# Patient Record
Sex: FEMALE | Race: BLACK OR AFRICAN AMERICAN | Employment: UNEMPLOYED | URBAN - METROPOLITAN AREA
[De-identification: names, ages, dates, MRNs, and addresses within clinical notes are randomized per-mention and may not be internally consistent; named-entity substitution may affect disease eponyms.]

---

## 2017-07-30 ENCOUNTER — HOSPITAL ENCOUNTER (EMERGENCY)
Age: 57
Discharge: HOME | End: 2017-07-31
Attending: EMERGENCY MEDICINE | Admitting: EMERGENCY MEDICINE
Payer: MEDICAID

## 2017-07-30 VITALS
DIASTOLIC BLOOD PRESSURE: 101 MMHG | SYSTOLIC BLOOD PRESSURE: 146 MMHG | TEMPERATURE: 98.2 F | RESPIRATION RATE: 18 BRPM | OXYGEN SATURATION: 98 % | WEIGHT: 202 LBS | HEART RATE: 98 BPM | BODY MASS INDEX: 29.4 KG/M2

## 2017-07-30 DIAGNOSIS — R03.0 ELEVATED BLOOD PRESSURE READING: ICD-10-CM

## 2017-07-30 DIAGNOSIS — M54.40 ACUTE RIGHT-SIDED LOW BACK PAIN WITH SCIATICA, SCIATICA LATERALITY UNSPECIFIED: Primary | ICD-10-CM

## 2017-07-30 PROCEDURE — 96372 THER/PROPH/DIAG INJ SC/IM: CPT

## 2017-07-30 PROCEDURE — 99282 EMERGENCY DEPT VISIT SF MDM: CPT

## 2017-07-31 PROCEDURE — 74011250636 HC RX REV CODE- 250/636: Performed by: NURSE PRACTITIONER

## 2017-07-31 RX ORDER — MORPHINE SULFATE 4 MG/ML
4 INJECTION, SOLUTION INTRAMUSCULAR; INTRAVENOUS
Status: COMPLETED | OUTPATIENT
Start: 2017-07-31 | End: 2017-07-31

## 2017-07-31 RX ORDER — OXYCODONE AND ACETAMINOPHEN 5; 325 MG/1; MG/1
1 TABLET ORAL
Qty: 12 TAB | Refills: 0 | Status: SHIPPED | OUTPATIENT
Start: 2017-07-31 | End: 2018-03-13

## 2017-07-31 RX ORDER — CYCLOBENZAPRINE HCL 10 MG
10 TABLET ORAL
Qty: 12 TAB | Refills: 0 | Status: SHIPPED | OUTPATIENT
Start: 2017-07-31 | End: 2018-03-13

## 2017-07-31 RX ADMIN — Medication 4 MG: at 00:16

## 2017-07-31 NOTE — ED NOTES
I have reviewed discharge instructions with the patient. The patient verbalized understanding. Discharge medications reviewed with patient and appropriate educational materials and side effects teaching were provided. Patient armband removed and shredded. Pt ambulated to [facility] to await ride home.

## 2017-07-31 NOTE — DISCHARGE INSTRUCTIONS
Back Pain: Care Instructions  Your Care Instructions    Back pain has many possible causes. It is often related to problems with muscles and ligaments of the back. It may also be related to problems with the nerves, discs, or bones of the back. Moving, lifting, standing, sitting, or sleeping in an awkward way can strain the back. Sometimes you don't notice the injury until later. Arthritis is another common cause of back pain. Although it may hurt a lot, back pain usually improves on its own within several weeks. Most people recover in 12 weeks or less. Using good home treatment and being careful not to stress your back can help you feel better sooner. Follow-up care is a key part of your treatment and safety. Be sure to make and go to all appointments, and call your doctor if you are having problems. Its also a good idea to know your test results and keep a list of the medicines you take. How can you care for yourself at home? · Sit or lie in positions that are most comfortable and reduce your pain. Try one of these positions when you lie down:  ¨ Lie on your back with your knees bent and supported by large pillows. ¨ Lie on the floor with your legs on the seat of a sofa or chair. Danilo Ebbing on your side with your knees and hips bent and a pillow between your legs. ¨ Lie on your stomach if it does not make pain worse. · Do not sit up in bed, and avoid soft couches and twisted positions. Bed rest can help relieve pain at first, but it delays healing. Avoid bed rest after the first day of back pain. · Change positions every 30 minutes. If you must sit for long periods of time, take breaks from sitting. Get up and walk around, or lie in a comfortable position. · Try using a heating pad on a low or medium setting for 15 to 20 minutes every 2 or 3 hours. Try a warm shower in place of one session with the heating pad. · You can also try an ice pack for 10 to 15 minutes every 2 to 3 hours.  Put a thin cloth between the ice pack and your skin. · Take pain medicines exactly as directed. ¨ If the doctor gave you a prescription medicine for pain, take it as prescribed. ¨ If you are not taking a prescription pain medicine, ask your doctor if you can take an over-the-counter medicine. · Take short walks several times a day. You can start with 5 to 10 minutes, 3 or 4 times a day, and work up to longer walks. Walk on level surfaces and avoid hills and stairs until your back is better. · Return to work and other activities as soon as you can. Continued rest without activity is usually not good for your back. · To prevent future back pain, do exercises to stretch and strengthen your back and stomach. Learn how to use good posture, safe lifting techniques, and proper body mechanics. When should you call for help? Call your doctor now or seek immediate medical care if:  · You have new or worsening numbness in your legs. · You have new or worsening weakness in your legs. (This could make it hard to stand up.)  · You lose control of your bladder or bowels. Watch closely for changes in your health, and be sure to contact your doctor if:  · Your pain gets worse. · You are not getting better after 2 weeks. Where can you learn more? Go to http://jessica-christina.info/. Enter R803 in the search box to learn more about \"Back Pain: Care Instructions. \"  Current as of: March 21, 2017  Content Version: 11.3  © 1397-4907 [employer]. Care instructions adapted under license by [employer] (which disclaims liability or warranty for this information). If you have questions about a medical condition or this instruction, always ask your healthcare professional. [address] any warranty or liability for your use of this information. Leapforce Activation    Thank you for requesting access to [employer].  Please follow the instructions below to securely access and download your online medical record. DooBop allows you to send messages to your doctor, view your test results, renew your prescriptions, schedule appointments, and more. How Do I Sign Up? 1. In your internet browser, go to [email or web address]  2. Click on the First Time User? Click Here link in the Sign In box. You will be redirect to the New Member Sign Up page. 3. Enter your DooBop Access Code exactly as it appears below. You will not need to use this code after youve completed the sign-up process. If you do not sign up before the expiration date, you must request a new code. DooBop Access Code: [email or web address]  Expires: 10/29/2017  1:22 AM (This is the date your DooBop access code will )    4. Enter the last four digits of your Social Security Number (xxxx) and Date of Birth (mm/dd/yyyy) as indicated and click Submit. You will be taken to the next sign-up page. 5. Create a DooBop ID. This will be your DooBop login ID and cannot be changed, so think of one that is secure and easy to remember. 6. Create a DooBop password. You can change your password at any time. 7. Enter your Password Reset Question and Answer. This can be used at a later time if you forget your password. 8. Enter your e-mail address. You will receive e-mail notification when new information is available in [address] 9. Click Sign Up. You can now view and download portions of your medical record. 10. Click the Download Summary menu link to download a portable copy of your medical information. Additional Information    If you have questions, please visit the Frequently Asked Questions section of the DooBop website at [email or web address] Remember, DooBop is NOT to be used for urgent needs. For medical emergencies, dial 911.

## 2017-07-31 NOTE — ED PROVIDER NOTES
HPI Comments:   12:00 AM  62 y.o. female presents to ED C/O back pain. Patient has a HX of HTN, asthma, sciatica, HIV. Patient reports she is having an exacerbation of her sciatica, this happens every 4-5 months. Patient reports right lower back and radiates to both legs, worse on right leg. Patient denies loss of sensation to legs, loss of bowel or bladder function, trauma, injury. Patient smokes 1/2ppd. Pt denies any other sxs or complaints. Written by Dirk RAMOS      The history is provided by the patient. History limited by: No langauge barrier. Past Medical History:   Diagnosis Date    Asthma     [facility]     [facility]     Hypertension     Sciatica        History reviewed. No pertinent surgical history. Family History:   Problem Relation Age of Onset    Asthma Other     Hypertension Other        Social History     Social History    Marital status: LEGALLY      Spouse name: N/A    Number of children: N/A    Years of education: N/A     Occupational History    Not on file. Social History Main Topics    Smoking status: Current Every Day Smoker     Packs/day: 0.50     Years: 43.00    Smokeless tobacco: Not on file    Alcohol use Yes      Comment: social     Drug use: No    Sexual activity: Not on file     Other Topics Concern    Not on file     Social History Narrative         ALLERGIES: Benadryl [diphenhydramine hcl]; Lortab [hydrocodone-acetaminophen]; Motrin [ibuprofen]; and Penicillins    Review of Systems   Respiratory: Negative for shortness of breath. Cardiovascular: Negative for chest pain. Gastrointestinal: Negative for abdominal pain. Musculoskeletal: Positive for back pain. Neurological: Negative for dizziness, syncope, weakness and light-headedness.        Vitals:    07/30/17 2256   BP: (!) 146/101   Pulse: 98   Resp: 18   Temp: 98.2 °F (36.8 °C) SpO2: 98%   Weight: 91.6 kg (202 lb)            Physical Exam   Constitutional: She is oriented to person, place, and time. She appears well-developed and well-nourished. No distress. Cardiovascular: Normal rate, regular rhythm and normal heart sounds. Pulses:       Dorsalis pedis pulses are 2+ on the right side, and 2+ on the left side. Pulmonary/Chest: Effort normal and breath sounds normal. No respiratory distress. She has no wheezes. She has no rales. Musculoskeletal:        Back:    Neurological: She is alert and oriented to person, place, and time. She exhibits normal muscle tone. Coordination normal.   Skin: Skin is warm and dry. No rash noted. She is not diaphoretic. No erythema. No pallor. Nursing note and vitals reviewed. MDM  Number of Diagnoses or Management Options  Acute right-sided low back pain with sciatica, sciatica laterality unspecified:   Elevated blood pressure reading:   Diagnosis management comments: Clinical Impression - acute exacerbation of chronic sciatica    MDM:  Pain shot given. No indication for imagining, no acute trauma, or change in pain quality, no neuro deficits. Patient given 1 morphine shot prior to discharge with some improvement  in pain, will discharge with flexeril and percocet, patient reports prednisone doesn't help. Patient referred to PCP for elevated blood pressure and to orthopedics for further evaluation of sciatica. Patient educated to return to the ED for any new or worsening symptoms. questions denied. ED Course       Procedures             RESULTS:    No orders to display       Labs Reviewed - No data to display    No results found for this or any previous visit (from the past 12 hour(s)). PROGRESS NOTE:   12:00AM  Initial assessment completed. Written by Dirk Loya NPYEN     One or more blood pressure readings were noted elevated during the Pt's presentation in the emergency department this date.   This abnormal reading has been cited in the Pt's diagnosis, and they have been encouraged to follow up with their primary care physician, or referred to a consultant for further evaluation and treatment. DISCHARGE NOTE:    Mitzy Flaherty Caesar's  results have been reviewed with her. She has been counseled regarding her diagnosis, treatment, and plan. She verbally conveys understanding and agreement of the signs, symptoms, diagnosis, treatment and prognosis and additionally agrees to follow up as discussed. She also agrees with the care-plan and conveys that all of her questions have been answered. I have also provided discharge instructions for her that include: educational information regarding their diagnosis and treatment, and list of reasons why they would want to return to the ED prior to their follow-up appointment, should her condition change. CLINICAL IMPRESSION:    1. Acute right-sided low back pain with sciatica, sciatica laterality unspecified    2. Elevated blood pressure reading        AFTER VISIT PLAN:    Discharge Medication List as of 7/31/2017  1:23 AM      START taking these medications    Details   oxyCODONE-acetaminophen (PERCOCET) 5-325 mg per tablet Take 1 Tab by mouth every six (6) hours as needed for Pain. Max Daily Amount: 4 Tabs., Print, Disp-12 Tab, R-0         CONTINUE these medications which have CHANGED    Details   cyclobenzaprine (FLEXERIL) 10 mg tablet Take 1 Tab by mouth three (3) times daily as needed for Muscle Spasm(s). , Print, Disp-12 Tab, R-0         CONTINUE these medications which have NOT CHANGED    Details   traZODone (DESYREL) 150 mg tablet Take 150 mg by mouth nightly., Historical Med      sertraline (ZOLOFT) 25 mg tablet Take  by mouth daily. , Historical Med      albuterol (PROVENTIL VENTOLIN) 2.5 mg /3 mL (0.083 %) nebulizer solution 3 mL by Nebulization route every four (4) hours as needed for Wheezing or Shortness of Breath., Print, Disp-24 Each, R-0 ELVITEGR/COBICIST/EMTRIC/TENOF (STRIBILD PO) Take  by mouth., Historical Med      hydrochlorothiazide (HYDRODIURIL) 25 mg tablet Take 25 mg by mouth daily. , Historical Med         STOP taking these medications       HYDROcodone-acetaminophen (NORCO) 5-325 mg per tablet Comments:   Reason for Stopping:                 Follow-up Information     Follow up With Details Comments [facility] MD DEAN Schedule an appointment as soon as possible for a visit in 1 week Further evaluation [address] [place] [id number]  [address] [place] [address] In 1 week As needed [address] Little, [address] [place] [address]  [phone]           Written by Clovis RAMOS

## 2017-07-31 NOTE — ED NOTES
Pt c/o sciatic pain that starts in her back and radiates down the back of her right leg. States this happens \"every six months. \" denies incontinence

## 2018-03-13 ENCOUNTER — APPOINTMENT (OUTPATIENT)
Dept: GENERAL RADIOLOGY | Age: 58
End: 2018-03-13
Attending: PHYSICIAN ASSISTANT
Payer: MEDICAID

## 2018-03-13 ENCOUNTER — HOSPITAL ENCOUNTER (EMERGENCY)
Age: 58
Discharge: HOME | End: 2018-03-13
Attending: EMERGENCY MEDICINE | Admitting: EMERGENCY MEDICINE
Payer: MEDICAID

## 2018-03-13 VITALS
DIASTOLIC BLOOD PRESSURE: 99 MMHG | RESPIRATION RATE: 15 BRPM | HEART RATE: 102 BPM | OXYGEN SATURATION: 99 % | SYSTOLIC BLOOD PRESSURE: 158 MMHG | TEMPERATURE: 98.2 F

## 2018-03-13 DIAGNOSIS — J45.21 MILD INTERMITTENT ASTHMA WITH ACUTE EXACERBATION IN ADULT: ICD-10-CM

## 2018-03-13 DIAGNOSIS — R09.81 NASAL CONGESTION: ICD-10-CM

## 2018-03-13 DIAGNOSIS — J06.9 ACUTE URI: Primary | ICD-10-CM

## 2018-03-13 DIAGNOSIS — J02.9 SORE THROAT: ICD-10-CM

## 2018-03-13 PROBLEM — B20 HIV DISEASE (HCC): Status: ACTIVE | Noted: 2018-03-13

## 2018-03-13 PROCEDURE — 71046 X-RAY EXAM CHEST 2 VIEWS: CPT

## 2018-03-13 PROCEDURE — 94640 AIRWAY INHALATION TREATMENT: CPT

## 2018-03-13 PROCEDURE — 74011636637 HC RX REV CODE- 636/637: Performed by: EMERGENCY MEDICINE

## 2018-03-13 PROCEDURE — 74011250637 HC RX REV CODE- 250/637: Performed by: EMERGENCY MEDICINE

## 2018-03-13 PROCEDURE — 77030029684 HC NEB SM VOL KT MONA -A

## 2018-03-13 PROCEDURE — 99283 EMERGENCY DEPT VISIT LOW MDM: CPT

## 2018-03-13 PROCEDURE — 74011000250 HC RX REV CODE- 250: Performed by: EMERGENCY MEDICINE

## 2018-03-13 RX ORDER — BUSPIRONE HYDROCHLORIDE 10 MG/1
10 TABLET ORAL 3 TIMES DAILY
COMMUNITY

## 2018-03-13 RX ORDER — PREDNISONE 20 MG/1
60 TABLET ORAL
Status: COMPLETED | OUTPATIENT
Start: 2018-03-13 | End: 2018-03-13

## 2018-03-13 RX ORDER — PREDNISONE 10 MG/1
TABLET ORAL
Qty: 21 TAB | Refills: 0 | Status: SHIPPED | OUTPATIENT
Start: 2018-03-13 | End: 2018-04-20

## 2018-03-13 RX ORDER — HYDROCODONE POLISTIREX AND CHLORPHENIRAMINE POLISTIREX 10; 8 MG/5ML; MG/5ML
5 SUSPENSION, EXTENDED RELEASE ORAL
Qty: 60 ML | Refills: 0 | Status: SHIPPED | OUTPATIENT
Start: 2018-03-13 | End: 2018-04-20

## 2018-03-13 RX ORDER — OXYMETAZOLINE HCL 0.05 %
2 SPRAY, NON-AEROSOL (ML) NASAL
Status: COMPLETED | OUTPATIENT
Start: 2018-03-13 | End: 2018-03-13

## 2018-03-13 RX ORDER — ONDANSETRON 4 MG/1
4 TABLET, ORALLY DISINTEGRATING ORAL
Qty: 10 TAB | Refills: 0 | Status: SHIPPED | OUTPATIENT
Start: 2018-03-13

## 2018-03-13 RX ORDER — IPRATROPIUM BROMIDE AND ALBUTEROL SULFATE 2.5; .5 MG/3ML; MG/3ML
3 SOLUTION RESPIRATORY (INHALATION)
Status: COMPLETED | OUTPATIENT
Start: 2018-03-13 | End: 2018-03-13

## 2018-03-13 RX ORDER — HYDROCODONE POLISTIREX AND CHLORPHENIRAMINE POLISTIREX 10; 8 MG/5ML; MG/5ML
5 SUSPENSION, EXTENDED RELEASE ORAL
Status: COMPLETED | OUTPATIENT
Start: 2018-03-13 | End: 2018-03-13

## 2018-03-13 RX ORDER — PAROXETINE 10 MG/1
TABLET, FILM COATED ORAL DAILY
COMMUNITY

## 2018-03-13 RX ORDER — OXYMETAZOLINE HCL 0.05 %
2 SPRAY, NON-AEROSOL (ML) NASAL 2 TIMES DAILY
Qty: 1 EACH | Refills: 0 | Status: SHIPPED | OUTPATIENT
Start: 2018-03-13 | End: 2018-03-16

## 2018-03-13 RX ORDER — ONDANSETRON 4 MG/1
4 TABLET, ORALLY DISINTEGRATING ORAL
Status: COMPLETED | OUTPATIENT
Start: 2018-03-13 | End: 2018-03-13

## 2018-03-13 RX ORDER — GABAPENTIN 100 MG/1
CAPSULE ORAL 3 TIMES DAILY
COMMUNITY

## 2018-03-13 RX ORDER — EMTRICITABINE AND TENOFOVIR DISOPROXIL FUMARATE 200; 300 MG/1; MG/1
TABLET, FILM COATED ORAL DAILY
COMMUNITY
End: 2018-04-20

## 2018-03-13 RX ADMIN — ONDANSETRON 4 MG: 4 TABLET, ORALLY DISINTEGRATING ORAL at 20:15

## 2018-03-13 RX ADMIN — PREDNISONE 60 MG: 20 TABLET ORAL at 20:15

## 2018-03-13 RX ADMIN — HYDROCODONE POLISTIREX AND CHLORPHENIRAMINE POLISTIREX 5 ML: 10; 8 SUSPENSION, EXTENDED RELEASE ORAL at 20:16

## 2018-03-13 RX ADMIN — OXYMETAZOLINE HYDROCHLORIDE 2 SPRAY: 5 SPRAY NASAL at 20:17

## 2018-03-13 RX ADMIN — IPRATROPIUM BROMIDE AND ALBUTEROL SULFATE 3 ML: .5; 3 SOLUTION RESPIRATORY (INHALATION) at 20:16

## 2018-03-13 NOTE — ED TRIAGE NOTES
Seen at [facility] and told has flu. Pt feels has sinus infection.  Facial pressure and congestion

## 2018-03-13 NOTE — ED PROVIDER NOTES
HPI Comments: Nano Ferguson is a 62 y.o. Female, with h/o hiv, well controlled with c/o nasal congestion, facial pressure, cough, wheezing, body aches sore throat for last 3 days. Seen at [facility] for same, placed on tamiflu, cxr neg. States her sx are worse. Using theraflu without relief. occ wheezing, prod cough. No hemoptysis, fever, vomiting, diarrhea, urinary , sx rash. Worse with breathing activity; h/o asthma with inhaler, neb at home    The history is provided by the patient and medical records. Past Medical History:   Diagnosis Date    Asthma     [facility]     HIV (human immunodeficiency virus infection) [facility]     Hypertension     Sciatica        History reviewed. No pertinent surgical history. Family History:   Problem Relation Age of Onset    Asthma Other     Hypertension Other        Social History     Social History    Marital status: LEGALLY      Spouse name: N/A    Number of children: N/A    Years of education: N/A     Occupational History    Not on file. Social History Main Topics    Smoking status: Current Every Day Smoker     Packs/day: 0.50     Years: 43.00    Smokeless tobacco: Never Used    Alcohol use Yes      Comment: social     Drug use: No    Sexual activity: Not on file     Other Topics Concern    Not on file     Social History Narrative         ALLERGIES: Benadryl [diphenhydramine hcl]; Lortab [hydrocodone-acetaminophen]; Motrin [ibuprofen]; and Penicillins    Review of Systems   Constitutional: Positive for fatigue. Negative for fever. HENT: Positive for congestion, postnasal drip, rhinorrhea, sinus pain, sinus pressure, sneezing and sore throat. Negative for facial swelling, tinnitus, trouble swallowing and voice change. Eyes: Negative for visual disturbance. Respiratory: Positive for cough, shortness of breath and wheezing.     Cardiovascular: Positive for chest pain (with cough). Gastrointestinal: Negative for abdominal pain. Endocrine: Negative for polyuria. Genitourinary: Negative for difficulty urinating and dysuria. Musculoskeletal: Positive for myalgias. Negative for gait problem. Skin: Negative for rash. Allergic/Immunologic: Negative for immunocompromised state. Neurological: Positive for dizziness and headaches. Psychiatric/Behavioral: Positive for sleep disturbance. Vitals:    03/13/18 1854 03/13/18 2033   BP: (!) 157/100 (!) 158/99   Pulse: (!) 109 (!) 102   Resp: 15    Temp: 98.2 °F (36.8 °C)    SpO2: 98% 99%            Physical Exam   Constitutional: She is oriented to person, place, and time. She appears well-developed and well-nourished. No distress. She is not intubated. HENT:   Head: Normocephalic and atraumatic. Right Ear: External ear normal.   Left Ear: External ear normal.   Nose: Mucosal edema and rhinorrhea present. Right sinus exhibits maxillary sinus tenderness and frontal sinus tenderness. Left sinus exhibits maxillary sinus tenderness and frontal sinus tenderness. Mouth/Throat: Uvula is midline and mucous membranes are normal. No oral lesions. Posterior oropharyngeal erythema present. No oropharyngeal exudate, posterior oropharyngeal edema or tonsillar abscesses. Eyes: Conjunctivae are normal. No scleral icterus. Neck: Neck supple. Cardiovascular: Normal rate, regular rhythm, normal heart sounds and intact distal pulses. Pulmonary/Chest: Effort normal. No tachypnea. She is not intubated. No respiratory distress. She has no decreased breath sounds. She has wheezes. She has no rhonchi. She has no rales. Abdominal: Soft. There is no tenderness. Musculoskeletal: She exhibits no edema. Neurological: She is alert and oriented to person, place, and time. Gait normal.   Skin: Skin is warm and dry. She is not diaphoretic. Psychiatric: Her behavior is normal.   Nursing note and vitals reviewed.        MDM      ED Course Procedures    Vitals:  Patient Vitals for the past 12 hrs:   Temp Pulse Resp BP SpO2   03/13/18 2033 - (!) 102 - (!) 158/99 99 %   03/13/18 1854 98.2 °F (36.8 °C) (!) 109 15 (!) 157/100 98 %         Medications ordered:   Medications   albuterol-ipratropium (DUO-NEB) 2.5 MG-0.5 MG/3 ML (3 mL Nebulization Given 3/13/18 2016)   predniSONE (DELTASONE) tablet 60 mg (60 mg Oral Given 3/13/18 2015)   ondansetron (ZOFRAN ODT) tablet 4 mg (4 mg Oral Given 3/13/18 2015)   chlorpheniramine-HYDROcodone (TUSSIONEX) oral suspension 5 mL (5 mL Oral Given 3/13/18 2016)   oxymetazoline (AFRIN) 0.05 % nasal spray 2 Spray (2 Sprays Both Nostrils Given 3/13/18 2017)         Lab findings:  No results found for this or any previous visit (from the past 12 hour(s)). EKG interpretation by ED Physician:      X-Ray, CT or other radiology findings or impressions:  XR CHEST PA LAT    (Results Pending)   some bronchial thickening no consolidation, infiltrate    Progress notes, Consult notes or additional Procedure notes:   Feels better after meds here. Lungs clear. Doubt need for abx. Not immunocompromised. Low viral load, cd4 >1000  I have discussed with patient and/or family/sig other the results, interpretation of any imaging if performed, suspected diagnosis and treatment plan to include instructions regarding the diagnoses listed to which understanding was expressed with all questions answered      Reevaluation of patient:   stable    Disposition:  Diagnosis:   1. Acute URI    2. Nasal congestion    3. Mild intermittent asthma with acute exacerbation in adult    4.  Sore throat        Disposition: home    Follow-up Information     Follow [facility] DEAN Schedule an appointment as soon as possible for a visit  [facility]  [phone]              Discharge Medication List as of 3/13/2018  8:40 PM      START taking these medications    Details predniSONE (STERAPRED DS) 10 mg dose pack Take as package directed, Print, Disp-21 Tab, R-0      benzocaine-menthol (CEPACOL SORE THROAT, SARAH BETH-MEN,) 15-2.6 mg lozg lozenge Take 1 Lozenge by mouth every two (2) hours as needed for Sore throat., Print, Disp-18 Lozenge, R-0      chlorpheniramine-HYDROcodone (TUSSIONEX PENNKINETIC ER) 10-8 mg/5 mL suspension Take 5 mL by mouth every twelve (12) hours as needed for Cough. Max Daily Amount: 10 mL., Print, Disp-60 mL, R-0      oxymetazoline (AFRIN, OXYMETAZOLINE,) 0.05 % nasal spray 2 Sprays by Both Nostrils route two (2) times a day for 3 days. , Print, Disp-1 Each, R-0      ondansetron (ZOFRAN ODT) 4 mg disintegrating tablet Take 1 Tab by mouth every eight (8) hours as needed for Nausea. , Print, Disp-10 Tab, R-0         CONTINUE these medications which have NOT CHANGED    Details   PARoxetine (PAXIL) 10 mg tablet Take  by mouth daily. , Historical Med      busPIRone (BUSPAR) 10 mg tablet Take 10 mg by mouth three (3) times daily. , Historical Med      emtricitabine-tenofovir, TDF, (TRUVADA) 200-300 mg per tablet Take  by mouth daily. , Historical Med      gabapentin (NEURONTIN) 100 mg capsule Take  by mouth three (3) times daily. , Historical Med      hydrochlorothiazide (HYDRODIURIL) 25 mg tablet Take 25 mg by mouth daily. , Historical Med      albuterol (PROVENTIL VENTOLIN) 2.5 mg /3 mL (0.083 %) nebulizer solution 3 mL by Nebulization route every four (4) hours as needed for Wheezing or Shortness of Breath., Print, Disp-24 Each, R-0

## 2018-03-14 NOTE — ED NOTES
Pt discharged to home ambulatory and in company of self  Discharge instructions provided via discussion and handout. Teaching to patient  Verbalized understanding. No questions voiced. Discharged with 5 RX.

## 2018-04-19 ENCOUNTER — APPOINTMENT (OUTPATIENT)
Dept: GENERAL RADIOLOGY | Age: 58
DRG: 141 | End: 2018-04-19
Attending: EMERGENCY MEDICINE
Payer: MEDICAID

## 2018-04-19 ENCOUNTER — HOSPITAL ENCOUNTER (INPATIENT)
Age: 58
LOS: 1 days | Discharge: HOME | DRG: 141 | End: 2018-04-20
Attending: EMERGENCY MEDICINE | Admitting: HOSPITALIST
Payer: MEDICAID

## 2018-04-19 DIAGNOSIS — B20 HIV DISEASE (HCC): ICD-10-CM

## 2018-04-19 DIAGNOSIS — J45.52 SEVERE PERSISTENT ASTHMA WITH STATUS ASTHMATICUS: ICD-10-CM

## 2018-04-19 DIAGNOSIS — R06.02 SOB (SHORTNESS OF BREATH): Primary | ICD-10-CM

## 2018-04-19 DIAGNOSIS — R05.9 COUGH: ICD-10-CM

## 2018-04-19 PROBLEM — J45.901 ASTHMA EXACERBATION: Status: ACTIVE | Noted: 2018-04-19

## 2018-04-19 LAB
ALBUMIN SERPL-MCNC: 3.3 G/DL (ref 3.4–5)
ALBUMIN/GLOB SERPL: 0.8 {RATIO} (ref 0.8–1.7)
ALP SERPL-CCNC: 68 U/L (ref 45–117)
ALT SERPL-CCNC: 20 U/L (ref 13–56)
ANION GAP SERPL CALC-SCNC: 10 MMOL/L (ref 3–18)
AST SERPL-CCNC: 19 U/L (ref 15–37)
ATRIAL RATE: 100 BPM
BILIRUB SERPL-MCNC: 0.3 MG/DL (ref 0.2–1)
BUN SERPL-MCNC: 5 MG/DL (ref 7–18)
BUN/CREAT SERPL: 6 (ref 12–20)
CALCIUM SERPL-MCNC: 8.3 MG/DL (ref 8.5–10.1)
CALCULATED P AXIS, ECG09: 70 DEGREES
CALCULATED R AXIS, ECG10: 67 DEGREES
CALCULATED T AXIS, ECG11: 57 DEGREES
CHLORIDE SERPL-SCNC: 104 MMOL/L (ref 100–108)
CO2 SERPL-SCNC: 27 MMOL/L (ref 21–32)
CREAT SERPL-MCNC: 0.8 MG/DL (ref 0.6–1.3)
DIAGNOSIS, 93000: NORMAL
ERYTHROCYTE [DISTWIDTH] IN BLOOD BY AUTOMATED COUNT: 13.3 % (ref 11.6–14.5)
FLUAV AG NPH QL IA: NEGATIVE
FLUBV AG NOSE QL IA: NEGATIVE
GLOBULIN SER CALC-MCNC: 4.2 G/DL (ref 2–4)
GLUCOSE SERPL-MCNC: 119 MG/DL (ref 74–99)
HCT VFR BLD AUTO: 39.4 % (ref 35–45)
HGB BLD-MCNC: 13.1 G/DL (ref 12–16)
MCH RBC QN AUTO: 31.1 PG (ref 24–34)
MCHC RBC AUTO-ENTMCNC: 33.2 G/DL (ref 31–37)
MCV RBC AUTO: 93.6 FL (ref 74–97)
P-R INTERVAL, ECG05: 140 MS
PLATELET # BLD AUTO: 270 K/UL (ref 135–420)
PMV BLD AUTO: 10 FL (ref 9.2–11.8)
POTASSIUM SERPL-SCNC: 3.3 MMOL/L (ref 3.5–5.5)
PROT SERPL-MCNC: 7.5 G/DL (ref 6.4–8.2)
Q-T INTERVAL, ECG07: 380 MS
QRS DURATION, ECG06: 78 MS
QTC CALCULATION (BEZET), ECG08: 490 MS
RBC # BLD AUTO: 4.21 M/UL (ref 4.2–5.3)
SODIUM SERPL-SCNC: 141 MMOL/L (ref 136–145)
VENTRICULAR RATE, ECG03: 100 BPM
WBC # BLD AUTO: 4.3 K/UL (ref 4.6–13.2)

## 2018-04-19 PROCEDURE — 94640 AIRWAY INHALATION TREATMENT: CPT

## 2018-04-19 PROCEDURE — 96375 TX/PRO/DX INJ NEW DRUG ADDON: CPT

## 2018-04-19 PROCEDURE — 77030029684 HC NEB SM VOL KT MONA -A

## 2018-04-19 PROCEDURE — 94664 DEMO&/EVAL PT USE INHALER: CPT

## 2018-04-19 PROCEDURE — 74011250637 HC RX REV CODE- 250/637: Performed by: HOSPITALIST

## 2018-04-19 PROCEDURE — 74011000250 HC RX REV CODE- 250: Performed by: EMERGENCY MEDICINE

## 2018-04-19 PROCEDURE — 85027 COMPLETE CBC AUTOMATED: CPT | Performed by: EMERGENCY MEDICINE

## 2018-04-19 PROCEDURE — 71045 X-RAY EXAM CHEST 1 VIEW: CPT

## 2018-04-19 PROCEDURE — 74011250636 HC RX REV CODE- 250/636: Performed by: EMERGENCY MEDICINE

## 2018-04-19 PROCEDURE — 99285 EMERGENCY DEPT VISIT HI MDM: CPT

## 2018-04-19 PROCEDURE — 77030018744 HC FLOMTR PEAK FLO -A

## 2018-04-19 PROCEDURE — 93005 ELECTROCARDIOGRAM TRACING: CPT

## 2018-04-19 PROCEDURE — 87804 INFLUENZA ASSAY W/OPTIC: CPT | Performed by: EMERGENCY MEDICINE

## 2018-04-19 PROCEDURE — 96365 THER/PROPH/DIAG IV INF INIT: CPT

## 2018-04-19 PROCEDURE — 80053 COMPREHEN METABOLIC PANEL: CPT | Performed by: EMERGENCY MEDICINE

## 2018-04-19 PROCEDURE — 74011000250 HC RX REV CODE- 250: Performed by: HOSPITALIST

## 2018-04-19 PROCEDURE — 65270000029 HC RM PRIVATE

## 2018-04-19 PROCEDURE — 74011250636 HC RX REV CODE- 250/636: Performed by: HOSPITALIST

## 2018-04-19 PROCEDURE — 74011250637 HC RX REV CODE- 250/637: Performed by: EMERGENCY MEDICINE

## 2018-04-19 RX ORDER — BUSPIRONE HYDROCHLORIDE 5 MG/1
10 TABLET ORAL 3 TIMES DAILY
Status: DISCONTINUED | OUTPATIENT
Start: 2018-04-19 | End: 2018-04-20 | Stop reason: HOSPADM

## 2018-04-19 RX ORDER — IPRATROPIUM BROMIDE AND ALBUTEROL SULFATE 2.5; .5 MG/3ML; MG/3ML
3 SOLUTION RESPIRATORY (INHALATION)
Status: DISPENSED | OUTPATIENT
Start: 2018-04-19 | End: 2018-04-19

## 2018-04-19 RX ORDER — ARFORMOTEROL TARTRATE 15 UG/2ML
15 SOLUTION RESPIRATORY (INHALATION)
Status: DISCONTINUED | OUTPATIENT
Start: 2018-04-19 | End: 2018-04-20 | Stop reason: HOSPADM

## 2018-04-19 RX ORDER — SODIUM CHLORIDE AND POTASSIUM CHLORIDE .9; .15 G/100ML; G/100ML
SOLUTION INTRAVENOUS CONTINUOUS
Status: DISCONTINUED | OUTPATIENT
Start: 2018-04-19 | End: 2018-04-20 | Stop reason: HOSPADM

## 2018-04-19 RX ORDER — ACETAMINOPHEN 325 MG/1
650 TABLET ORAL
Status: DISCONTINUED | OUTPATIENT
Start: 2018-04-19 | End: 2018-04-20 | Stop reason: HOSPADM

## 2018-04-19 RX ORDER — BUDESONIDE 0.5 MG/2ML
500 INHALANT ORAL
Status: DISCONTINUED | OUTPATIENT
Start: 2018-04-19 | End: 2018-04-20 | Stop reason: HOSPADM

## 2018-04-19 RX ORDER — GUAIFENESIN 600 MG/1
600 TABLET, EXTENDED RELEASE ORAL EVERY 12 HOURS
Status: DISCONTINUED | OUTPATIENT
Start: 2018-04-19 | End: 2018-04-20 | Stop reason: HOSPADM

## 2018-04-19 RX ORDER — IPRATROPIUM BROMIDE AND ALBUTEROL SULFATE 2.5; .5 MG/3ML; MG/3ML
3 SOLUTION RESPIRATORY (INHALATION)
Status: DISCONTINUED | OUTPATIENT
Start: 2018-04-19 | End: 2018-04-20 | Stop reason: HOSPADM

## 2018-04-19 RX ORDER — ENOXAPARIN SODIUM 100 MG/ML
40 INJECTION SUBCUTANEOUS EVERY 24 HOURS
Status: DISCONTINUED | OUTPATIENT
Start: 2018-04-19 | End: 2018-04-20 | Stop reason: HOSPADM

## 2018-04-19 RX ORDER — IPRATROPIUM BROMIDE AND ALBUTEROL SULFATE 2.5; .5 MG/3ML; MG/3ML
3 SOLUTION RESPIRATORY (INHALATION)
Status: COMPLETED | OUTPATIENT
Start: 2018-04-19 | End: 2018-04-19

## 2018-04-19 RX ORDER — IBUPROFEN 200 MG
1 TABLET ORAL DAILY
Status: DISCONTINUED | OUTPATIENT
Start: 2018-04-20 | End: 2018-04-20

## 2018-04-19 RX ORDER — EMTRICITABINE AND TENOFOVIR DISOPROXIL FUMARATE 200; 300 MG/1; MG/1
1 TABLET, FILM COATED ORAL DAILY
Status: DISCONTINUED | OUTPATIENT
Start: 2018-04-20 | End: 2018-04-19 | Stop reason: ALTCHOICE

## 2018-04-19 RX ORDER — BUTALBITAL, ACETAMINOPHEN AND CAFFEINE 300; 40; 50 MG/1; MG/1; MG/1
1 CAPSULE ORAL
Status: DISCONTINUED | OUTPATIENT
Start: 2018-04-19 | End: 2018-04-20 | Stop reason: HOSPADM

## 2018-04-19 RX ORDER — MAGNESIUM SULFATE 1 G/100ML
1 INJECTION INTRAVENOUS
Status: COMPLETED | OUTPATIENT
Start: 2018-04-19 | End: 2018-04-19

## 2018-04-19 RX ORDER — PAROXETINE HYDROCHLORIDE 20 MG/1
10 TABLET, FILM COATED ORAL DAILY
Status: DISCONTINUED | OUTPATIENT
Start: 2018-04-20 | End: 2018-04-20 | Stop reason: HOSPADM

## 2018-04-19 RX ORDER — GABAPENTIN 100 MG/1
100 CAPSULE ORAL 3 TIMES DAILY
Status: DISCONTINUED | OUTPATIENT
Start: 2018-04-19 | End: 2018-04-20 | Stop reason: HOSPADM

## 2018-04-19 RX ORDER — MAGNESIUM SULFATE HEPTAHYDRATE 40 MG/ML
2 INJECTION, SOLUTION INTRAVENOUS
Status: COMPLETED | OUTPATIENT
Start: 2018-04-19 | End: 2018-04-19

## 2018-04-19 RX ORDER — POTASSIUM CHLORIDE 20 MEQ/1
40 TABLET, EXTENDED RELEASE ORAL
Status: COMPLETED | OUTPATIENT
Start: 2018-04-19 | End: 2018-04-19

## 2018-04-19 RX ADMIN — BUDESONIDE 500 MCG: 0.5 INHALANT RESPIRATORY (INHALATION) at 20:22

## 2018-04-19 RX ADMIN — GUAIFENESIN 600 MG: 600 TABLET, EXTENDED RELEASE ORAL at 21:38

## 2018-04-19 RX ADMIN — MAGNESIUM SULFATE HEPTAHYDRATE 2 G: 40 INJECTION, SOLUTION INTRAVENOUS at 10:39

## 2018-04-19 RX ADMIN — BUSPIRONE HYDROCHLORIDE 10 MG: 5 TABLET ORAL at 17:12

## 2018-04-19 RX ADMIN — IPRATROPIUM BROMIDE AND ALBUTEROL SULFATE 3 ML: .5; 3 SOLUTION RESPIRATORY (INHALATION) at 14:36

## 2018-04-19 RX ADMIN — ACETAMINOPHEN 650 MG: 325 TABLET, FILM COATED ORAL at 19:41

## 2018-04-19 RX ADMIN — ARFORMOTEROL TARTRATE 15 MCG: 15 SOLUTION RESPIRATORY (INHALATION) at 20:22

## 2018-04-19 RX ADMIN — MAGNESIUM SULFATE HEPTAHYDRATE 1 G: 1 INJECTION, SOLUTION INTRAVENOUS at 14:36

## 2018-04-19 RX ADMIN — IPRATROPIUM BROMIDE AND ALBUTEROL SULFATE 3 ML: .5; 3 SOLUTION RESPIRATORY (INHALATION) at 11:25

## 2018-04-19 RX ADMIN — METHYLPREDNISOLONE SODIUM SUCCINATE 20 MG: 125 INJECTION, POWDER, FOR SOLUTION INTRAMUSCULAR; INTRAVENOUS at 21:38

## 2018-04-19 RX ADMIN — METHYLPREDNISOLONE SODIUM SUCCINATE 125 MG: 125 INJECTION, POWDER, FOR SOLUTION INTRAMUSCULAR; INTRAVENOUS at 10:37

## 2018-04-19 RX ADMIN — SODIUM CHLORIDE AND POTASSIUM CHLORIDE: 9; 1.49 INJECTION, SOLUTION INTRAVENOUS at 17:12

## 2018-04-19 RX ADMIN — BUSPIRONE HYDROCHLORIDE 10 MG: 5 TABLET ORAL at 21:39

## 2018-04-19 RX ADMIN — ENOXAPARIN SODIUM 40 MG: 40 INJECTION SUBCUTANEOUS at 17:13

## 2018-04-19 RX ADMIN — IPRATROPIUM BROMIDE AND ALBUTEROL SULFATE 3 ML: .5; 3 SOLUTION RESPIRATORY (INHALATION) at 15:28

## 2018-04-19 RX ADMIN — IPRATROPIUM BROMIDE AND ALBUTEROL SULFATE 3 ML: .5; 3 SOLUTION RESPIRATORY (INHALATION) at 19:44

## 2018-04-19 RX ADMIN — POTASSIUM CHLORIDE 40 MEQ: 1500 TABLET, FILM COATED, EXTENDED RELEASE ORAL at 11:25

## 2018-04-19 RX ADMIN — GUAIFENESIN 600 MG: 600 TABLET, EXTENDED RELEASE ORAL at 10:40

## 2018-04-19 RX ADMIN — BUTALBITAL, ACETAMINOPHEN AND CAFFEINE 1 CAPSULE: 300; 40; 50 CAPSULE ORAL at 23:02

## 2018-04-19 RX ADMIN — GABAPENTIN 100 MG: 100 CAPSULE ORAL at 21:39

## 2018-04-19 RX ADMIN — IPRATROPIUM BROMIDE AND ALBUTEROL SULFATE 3 ML: .5; 3 SOLUTION RESPIRATORY (INHALATION) at 12:00

## 2018-04-19 RX ADMIN — GABAPENTIN 100 MG: 100 CAPSULE ORAL at 17:12

## 2018-04-19 RX ADMIN — IPRATROPIUM BROMIDE AND ALBUTEROL SULFATE 3 ML: .5; 3 SOLUTION RESPIRATORY (INHALATION) at 10:34

## 2018-04-19 NOTE — PROGRESS NOTES
completed the initial Spiritual Assessment of the patient in bed 2 of the emergency room and offered Pastoral Care support. .  No family seen at time of this visit. Patient does not have any Jain/cultural needs that will affect patients preferences in health care.  Chaplains will continue to follow and will provide pastoral care on an as needed/requested basis     Chaplain Jadon Melvin   Board Certified [address] [place] [address]   [phone]

## 2018-04-19 NOTE — PROGRESS NOTES
Patient received to unit via stretcher from ED accompanied by ED tech to room. Patient is alert and oriented x 4, with no signs of distress or discomfort. No complaints at this time. Patient oriented to room and call bell use for assistance. Patient voiced understanding. Bed locked and in lowest position with call bell in reach. 1735- Patient complains of headache. Dr. Andry Olivares paged for orders. 56- Dr. Andry Olivares paged about above. Althia Starch Dr. Andry Olivares returned page. Orders received for DuoNeb nebulizer treatments q6h P. R.N and Tylenol 650 mg PO q6h P.R.N. RBV. 102 [facility] to verify DuoNeb and Tylenol. Told they will verify in a moment. 102 [facility] to veryify DuoNeb and Tylenol. Told they will verify immediately. 1930- Bedside and Verbal shift change report given to Jewels Rizvi RN  (oncoming nurse) by Baudilio Vera (offgoing nurse). Report included the following information SBAR, Kardex, Intake/Output, MAR and Recent Results.

## 2018-04-19 NOTE — ED NOTES
Pt reports feeling much better, pt up & walked to bathroom, denies sob at this time & wheezing subsided.

## 2018-04-19 NOTE — IP AVS SNAPSHOT
[address] [address] 
[phone] Patient: Jayden Boothe MRN: [id number]:[id number] A check chavo indicates which time of day the medication should be taken. My Medications START taking these medications Instructions Each Dose to Equal  
 Morning Noon Evening Bedtime  
 methylPREDNISolone 4 mg tablet Commonly known as:  MEDROL (PAPITO) Your last dose was: Your next dose is: Take 1 Tab by mouth Specific Days and Specific Times. Take as directed 4 mg  
    
   
   
   
  
 nicotine 14 mg/24 hr patch Commonly known as:  Cheryle Alt Start taking on:  4/21/2018 Your last dose was: Your next dose is:    
   
   
 1 Patch by TransDERmal route daily for 30 days. 1 Patch  
    
   
   
   
  
 omeprazole 20 mg capsule Commonly known as:  PRILOSEC Your last dose was: Your next dose is: Take 1 Cap by mouth daily. Indications: gastroesophageal reflux disease 20 mg CONTINUE taking these medications Instructions Each Dose to Equal  
 Morning Noon Evening Bedtime  
 albuterol 2.5 mg /3 mL (0.083 %) nebulizer solution Commonly known as:  PROVENTIL VENTOLIN Your last dose was: Your next dose is:    
   
   
 3 mL by Nebulization route every four (4) hours as needed for Wheezing or Shortness of Breath. 2.5 mg  
    
   
   
   
  
 benzocaine-menthol 15-2.6 mg Lozg lozenge Commonly known as:  CEPACOL SORE THROAT (SARAH BETH-MEN) Your last dose was: Your next dose is: Take 1 Lozenge by mouth every two (2) hours as needed for Sore throat. 1 Lozenge  
    
   
   
   
  
 busPIRone 10 mg tablet Commonly known as:  BUSPAR Your last dose was: Your next dose is: Take 10 mg by mouth three (3) times daily.   
 10 mg  
    
   
   
   
  
 gabapentin 100 mg capsule Commonly known as:  NEURONTIN Your last dose was: Your next dose is: Take  by mouth three (3) times daily. hydroCHLOROthiazide 25 mg tablet Commonly known as:  HYDRODIURIL Your last dose was: Your next dose is: Take 25 mg by mouth daily. 25 mg  
    
   
   
   
  
 ondansetron 4 mg disintegrating tablet Commonly known as:  ZOFRAN ODT Your last dose was: Your next dose is: Take 1 Tab by mouth every eight (8) hours as needed for Nausea. 4 mg PAXIL 10 mg tablet Generic drug:  PARoxetine Your last dose was: Your next dose is: Take  by mouth daily. STOP taking these medications   
 chlorpheniramine-HYDROcodone 10-8 mg/5 mL suspension Commonly known as:  TUSSIONEX PENNKINETIC ER  
   
  
 predniSONE 10 mg dose pack Commonly known as:  STERAPRED DS  
   
  
 TRUVADA 200-300 mg per tablet Generic drug:  emtricitabine-tenofovir (TDF) Where to Get Your Medications Information on where to get these meds will be given to you by the nurse or doctor. ! Ask your nurse or doctor about these medications  
  methylPREDNISolone 4 mg tablet  
 nicotine 14 mg/24 hr patch  
 omeprazole 20 mg capsule

## 2018-04-19 NOTE — PROGRESS NOTES
History and Physical    Patient: Brendon Lugo               Sex: female          DOA: 4/19/2018       YOB: 1960      Age:  62 y.o.        LOS:  LOS: 0 days        HPI:     Brendon Lugo is a 62 y.o. female who has multiple tattoos and who was admitted   To [facility] because of an onset of acute asthma. The pt also had an onset of chest pain with her shortness of breath . The pt has ah/o asthma since she was a teenager. the pt smokes 1/2 pack of cigarettes per day. the pt's last asthma attack was a month ago. the pt said that she has been wheezing for about 2 days . the  pt has hiv and is followed by [facility] and she is on truvada . Her CD4 count is 1100 . The pt's chest x ray was clear . she was having expiratory wheezing . The pt is obese  And is 215 lbs   She has ah/o depression   . she also has htn the pt had an ekg showing a nsr and a prolonged qt interval the pt will be given iv solumedrol and inhalers and an antibiotic . Her peak flow was low at 200. Past Medical History:   Diagnosis Date    Asthma     [facility] [facility]     [facility]human immunodeficiency virus [facility] [address] [facility]     Hypertension     Sciatica        Social History:   Tobacco use:1/2 pack per day    Alcohol use:none   Occupation:pt was a      Family History:pt has a son . In good health       Review of Systems    Constitutional:  No fever or weight loss  HEENT:  No headache or visual changes  Cardiovascular:chest pain which resolved   Respiratory:  shortness of breath and expiratory wheezing noted   GI:  No nausea or vomitting.   No diarrhea  :  No hematuria or dysuria  Skin:  No rashes or moles  Neuro:  No seizures or syncope  Hematological:  No bruising or bleeding  Endocrine:  No diabetes or thyroid disease    Physical Exam:      Visit Vitals    /77    Pulse 96    Temp 99 °F (37.2 °C)    Resp 22    Ht 5' 9\" (1.753 m)    Wt 97.5 kg (215 lb)    SpO2 97%    BMI 31.75 kg/m2       Physical Exam:    Gen:  No distress, alert   HEENT:  Normal cephalic atraumatic, extra-occular movements are intact. Neck:  Supple, No JVD  Lungs:  Expiratory wheezing  . Good air flow . Heart:  Regular Rate and Rhythm, normal S1 and S2,   Abdomen:  Soft, non tender, normal bowel sounds, no guarding.   Extremities:  Well perfused, no cyanosis or edema  Neurological:  Awake and alert, CN's are intact, normal strength throughout extremities  Skin:  No rashes or moles  Mental Status:  Normal thought process,  Laboratory Studies:    BMP:   Lab Results   Component Value Date/Time     04/19/2018 10:27 AM    K 3.3 (L) 04/19/2018 10:27 AM     04/19/2018 10:27 AM    CO2 27 04/19/2018 10:27 AM    AGAP 10 04/19/2018 10:27 AM     (H) 04/19/2018 10:27 AM    BUN 5 (L) 04/19/2018 10:27 AM    CREA 0.80 04/19/2018 10:27 AM    GFRAA >60 04/19/2018 10:27 AM    GFRNA >60 04/19/2018 10:27 AM     CMP:   Lab Results   Component Value Date/Time     04/19/2018 10:27 AM    K 3.3 (L) 04/19/2018 10:27 AM     04/19/2018 10:27 AM    CO2 27 04/19/2018 10:27 AM    AGAP 10 04/19/2018 10:27 AM     (H) 04/19/2018 10:27 AM    BUN 5 (L) 04/19/2018 10:27 AM    CREA 0.80 04/19/2018 10:27 AM    GFRAA >60 04/19/2018 10:27 AM    GFRNA >60 04/19/2018 10:27 AM    CA 8.3 (L) 04/19/2018 10:27 AM    ALB 3.3 (L) 04/19/2018 10:27 AM    TP 7.5 04/19/2018 10:27 AM    GLOB 4.2 (H) 04/19/2018 10:27 AM    AGRAT 0.8 04/19/2018 10:27 AM    SGOT 19 04/19/2018 10:27 AM    ALT 20 04/19/2018 10:27 AM     CBC:   Lab Results   Component Value Date/Time    WBC 4.3 (L) 04/19/2018 10:27 AM    HGB 13.1 04/19/2018 10:27 AM    HCT 39.4 04/19/2018 10:27 AM     04/19/2018 10:27 AM       Assessment/Plan     Active Problems:    Asthma exacerbation (4/19/2018)with expiratory wheezing   hiv positive with a cd4 count of 1100   htn   depression   H/o  Cigarette smoking     PLAN:admit to the hospital . The pt melody be on nasal o2 and will be on his hiv meds she will be given steroids, inhalers and antibiotics as needed

## 2018-04-19 NOTE — IP AVS SNAPSHOT
[address] [address] 
[phone] Patient: Gregory Pollock MRN: [id number] [id number] About your hospitalization You were admitted on:  April 19, 2018 You last received care in the:  [address] NEURO [facility] You were discharged on:  April 20, 2018 Why you were hospitalized Your primary diagnosis was:  Not on File Your diagnoses also included:  Asthma Exacerbation Follow-up Information Follow up With Details Comments Contact Info Lord Yael MORAN MD   [address] [place] [address] [phone] Discharge Orders None A check chavo indicates which time of day the medication should be taken. My Medications START taking these medications Instructions Each Dose to Equal  
 Morning Noon Evening Bedtime  
 methylPREDNISolone 4 mg tablet Commonly known as:  MEDROL (PAPITO) Your last dose was: Your next dose is: Take 1 Tab by mouth Specific Days and Specific Times. Take as directed 4 mg  
    
   
   
   
  
 nicotine 14 mg/24 hr patch Commonly known as:  Sandrasydney López Start taking on:  4/21/2018 Your last dose was: Your next dose is:    
   
   
 1 Patch by TransDERmal route daily for 30 days. 1 Patch  
    
   
   
   
  
 omeprazole 20 mg capsule Commonly known as:  PRILOSEC Your last dose was: Your next dose is: Take 1 Cap by mouth daily. Indications: gastroesophageal reflux disease 20 mg CONTINUE taking these medications Instructions Each Dose to Equal  
 Morning Noon Evening Bedtime  
 albuterol 2.5 mg /3 mL (0.083 %) nebulizer solution Commonly known as:  PROVENTIL VENTOLIN Your last dose was: Your next dose is:    
   
   
 3 mL by Nebulization route every four (4) hours as needed for Wheezing or Shortness of Breath.   
 2.5 mg  
    
 benzocaine-menthol 15-2.6 mg Lozg lozenge Commonly known as:  CEPACOL SORE THROAT (SARAH BETH-MEN) Your last dose was: Your next dose is: Take 1 Lozenge by mouth every two (2) hours as needed for Sore throat. 1 Lozenge  
    
   
   
   
  
 busPIRone 10 mg tablet Commonly known as:  BUSPAR Your last dose was: Your next dose is: Take 10 mg by mouth three (3) times daily. 10 mg  
    
   
   
   
  
 gabapentin 100 mg capsule Commonly known as:  NEURONTIN Your last dose was: Your next dose is: Take  by mouth three (3) times daily. hydroCHLOROthiazide 25 mg tablet Commonly known as:  HYDRODIURIL Your last dose was: Your next dose is: Take 25 mg by mouth daily. 25 mg  
    
   
   
   
  
 ondansetron 4 mg disintegrating tablet Commonly known as:  ZOFRAN ODT Your last dose was: Your next dose is: Take 1 Tab by mouth every eight (8) hours as needed for Nausea. 4 mg PAXIL 10 mg tablet Generic drug:  PARoxetine Your last dose was: Your next dose is: Take  by mouth daily. STOP taking these medications   
 chlorpheniramine-HYDROcodone 10-8 mg/5 mL suspension Commonly known as:  TUSSIONEX PENNKINETIC ER  
   
  
 predniSONE 10 mg dose pack Commonly known as:  STERAPRED DS  
   
  
 TRUVADA 200-300 mg per tablet Generic drug:  emtricitabine-tenofovir (TDF) Where to Get Your Medications Information on where to get these meds will be given to you by the nurse or doctor. ! Ask your nurse or doctor about these medications  
  methylPREDNISolone 4 mg tablet  
 nicotine 14 mg/24 hr patch  
 omeprazole 20 mg capsule Discharge Instructions Asthma Attack: Care Instructions Your Care Instructions During an asthma attack, the airways swell and narrow. This makes it hard to breathe. Severe asthma attacks can be life-threatening, but you can help prevent them by keeping your asthma under control and treating symptoms before they get bad. Symptoms include being short of breath, having chest tightness, coughing, and wheezing. Noting and treating these symptoms can also help you avoid future trips to the emergency room. The doctor has checked you carefully, but problems can develop later. If you notice any problems or new symptoms, get medical treatment right away. Follow-up care is a key part of your treatment and safety. Be sure to make and go to all appointments, and call your doctor if you are having problems. It's also a good idea to know your test results and keep a list of the medicines you take. How can you care for yourself at home? · Follow your asthma action plan to prevent and treat attacks. If you don't have an asthma action plan, work with your doctor to create one. · Take your asthma medicines exactly as prescribed. Talk to your doctor right away if you have any questions about how to take them. ¨ Use your quick-relief medicine when you have symptoms of an attack. Quick-relief medicine is usually an albuterol inhaler. Some people need to use quick-relief medicine before they exercise. ¨ Take your controller medicine every day, not just when you have symptoms. Controller medicine is usually an inhaled corticosteroid. The goal is to prevent problems before they occur. Don't use your controller medicine to treat an attack that has already started. It doesn't work fast enough to help. ¨ If your doctor prescribed corticosteroid pills to use during an attack, take them exactly as prescribed. It may take hours for the pills to work, but they may make the episode shorter and help you breathe better. ¨ Keep your quick-relief medicine with you at all times. · Talk to your doctor before using other medicines. Some medicines, such as aspirin, can cause asthma attacks in some people. · If you have a peak flow meter, use it to check how well you are breathing. This can help you predict when an asthma attack is going to occur. Then you can take medicine to prevent the asthma attack or make it less severe. · Do not smoke or allow others to smoke around you. Avoid smoky places. Smoking makes asthma worse. If you need help quitting, talk to your doctor about stop-smoking programs and medicines. These can increase your chances of quitting for good. · Learn what triggers an asthma attack for you, and avoid the triggers when you can. Common triggers include colds, smoke, air pollution, dust, pollen, mold, pets, cockroaches, stress, and cold air. · Avoid colds and the flu. Get a pneumococcal vaccine shot. If you have had one before, ask your doctor if you need a second dose. Get a flu vaccine every fall. If you must be around people with colds or the flu, wash your hands often. When should you call for help? Call 911 anytime you think you may need emergency care. For example, call if: 
? · You have severe trouble breathing. ?Call your doctor now or seek immediate medical care if: 
? · Your symptoms do not get better after you have followed your asthma action plan. ? · You have new or worse trouble breathing. ? · Your coughing and wheezing get worse. ? · You cough up dark brown or bloody mucus (sputum). ? · You have a new or higher fever. ? Watch closely for changes in your health, and be sure to contact your doctor if: 
? · You need to use quick-relief medicine on more than 2 days a week (unless it is just for exercise). ? · You cough more deeply or more often, especially if you notice more mucus or a change in the color of your mucus. ? · You are not getting better as expected. Where can you learn more? Go to http://jessica-christina.info/. Enter P249 in the search box to learn more about \"Asthma Attack: Care Instructions. \" Current as of: May 12, 2017 Content Version: 11.4 © 0150-4711 [employer]. Care instructions adapted under license by [employer] (which disclaims liability or warranty for this information). If you have questions about a medical condition or this instruction, always ask your healthcare professional. [address] any warranty or liability for your use of this information. [email or web address] Announcement We are excited to announce that we are making your provider's discharge notes available to you in [email or web address]. You will see these notes when they are completed and signed by the physician that discharged you from your recent hospital stay. If you have any questions or concerns about any information you see in [email or web address], please call the Health Information Department where you were seen or reach out to your Primary Care Provider for more information about your plan of care. Introducing [facility]! [employer] introduces [email or web address] patient portal. Now you can access parts of your medical record, email your doctor's office, and request medication refills online. 1. In your internet browser, go to [email or web address] 2. Click on the First Time User? Click Here link in the Sign In box. You will see the New Member Sign Up page. 3. Enter your [email or web address] Access Code exactly as it appears below. You will not need to use this code after youve completed the sign-up process. If you do not sign up before the expiration date, you must request a new code. · [email or web address] Access Code: [email or web address] Expires: 6/11/2018  8:40 PM 
 
4. Enter the last four digits of your Social Security Number (xxxx) and Date of Birth (mm/dd/yyyy) as indicated and click Submit. You will be taken to the next sign-up page. 5. Create a [employer] ID. This will be your [employer] login ID and cannot be changed, so think of one that is secure and easy to remember. 6. Create a [employer] password. You can change your password at any time. 7. Enter your Password Reset Question and Answer. This can be used at a later time if you forget your password. 8. Enter your e-mail address. You will receive e-mail notification when new information is available in [address] 9. Click Sign Up. You can now view and download portions of your medical record. 10. Click the Download Summary menu link to download a portable copy of your medical information. If you have questions, please visit the Frequently Asked Questions section of the [employer] website. Remember, [employer] is NOT to be used for urgent needs. For medical emergencies, dial 911. Now available from your iPhone and Android! Introducing Crescencio De La Paz As a [facility] patient, I wanted to make you aware of our electronic visit tool called Crescencio Devinmirnareggie. Cayla Carr 24/7 allows you to connect within minutes with a medical provider 24 hours a day, seven days a week via a mobile device or tablet or logging into a secure website from your computer. You can access Crescencio Torrezfin from anywhere in the [place]. A virtual visit might be right for you when you have a simple condition and feel like you just dont want to get out of bed, or cant get away from work for an appointment, when your regular [facility] provider is not available (evenings, weekends or holidays), or when youre out of town and need minor care. Electronic visits cost only $49 and if the [facility] 24/7 provider determines a prescription is needed to treat your condition, one can be electronically transmitted to a nearby pharmacy*. Please take a moment to enroll today if you have not already done so. The enrollment process is free and takes just a few minutes.   To enroll, please download the [employer] 24/7 serafin to your tablet or phone, or visit [email or web address] to enroll on your computer. And, as an [address] [place] [address] patient with a [employer] account, the results of your visits will be scanned into your electronic medical record and your primary care provider will be able to view the scanned results. We urge you to continue to see your regular [employer] provider for your ongoing medical care. And while your primary care provider may not be the one available when you seek a "[employer]" virtual visit, the peace of mind you get from getting a real diagnosis real time can be priceless. For more information on "[employer]", view our Frequently Asked Questions (FAQs) at [email or web address]. Sincerely, 
 
Reena Carranza MD 
Chief Medical Officer Eddie Bae *:  certain medications cannot be prescribed via "[employer]" Providers Seen During Your Hospitalization Provider Specialty Primary office phone Bob Swenson MD Emergency Medicine [phone] Bishop Linda MD Internal Medicine [phone] Your Primary Care Physician (PCP) Primary Care Physician Office Phone Office Fax Dasia Sebas [phone] You are allergic to the following Allergen Reactions Benadryl (Diphenhydramine Hcl) Myalgia Lortab (Hydrocodone-Acetaminophen) Nausea and Vomiting Motrin (Ibuprofen) Nausea and Vomiting Penicillins Other (comments)  
 hypotension Recent Documentation Height Weight BMI OB Status Smoking Status 1.753 m 97.5 kg 31.75 kg/m2 Postmenopausal Current Every Day Smoker Emergency Contacts Name Discharge Info Relation Home Work Mobile Gala Regalado DISCHARGE CAREGIVER [3] Other Relative [6] [phone] Patient Belongings The following personal items are in your possession at time of discharge: Dental Appliances: With patient  Visual Aid: None      Home Medications: None   Jewelry: Earrings, Body Piercing, Bracelet, Ring  Clothing: Pants, Shirt, Undergarments, Sweater, Footwear    Other Valuables: Cell Phone, [address], With patient Please provide this summary of care documentation to your next provider. Signatures-by signing, you are acknowledging that this After Visit Summary has been reviewed with you and you have received a copy. Patient Signature:  ____________________________________________________________ Date:  ____________________________________________________________  
  
[facility] Provider Signature:  ____________________________________________________________ Date:  ____________________________________________________________

## 2018-04-19 NOTE — ED PROVIDER NOTES
EMERGENCY DEPARTMENT HISTORY AND PHYSICAL EXAM    10:24 AM      Date: 4/19/2018  Patient Name: Bibi Armas    History of Presenting Illness     Chief Complaint   Patient presents with    Shortness of Breath         History Provided By: Patient    Chief Complaint: SOB   Duration:  2 days  Timing:  Worsening  Location: Chest   Quality: N/A  Severity: 9 out of 10  Modifying Factors: Reports the use of home nebulizers every 2-4 hours, which has provided no alleviation in the patient's symptoms. Associated Symptoms:  Associated symptoms include cough, congestion, rhinorrhea, chest pain, and chills. Additional History (Context): Bibi Armas is a 62 y.o. female with PMHx of HIV, PNA who presents with worsening SOB onset 2 days. Associated symptoms include cough, congestion, rhinorrhea, chest pain, and chills. Denies being around others with a similar presentation. Reports the use of home nebulizers every 2-4 hours, which has provided no alleviation in the patient's symptoms. Denies having home steroids. Reports last steroid use 1 month ago. Reports that she has a prior hx of this similar presentation. Denies hx of Prior intubation. Notes hospitalization for PNA 6 months ago. Denies hx of DVT/PE. Reports CD4 Count >1100. No other concerns were expressed at this time. PCP: Karin Hackett MD    Current Facility-Administered Medications   Medication Dose Route Frequency Provider Last Rate Last Dose    guaiFENesin ER (MUCINEX) tablet 600 mg  600 mg Oral Q12H Mariya Shaw MD   600 mg at 04/19/18 1040     Current Outpatient Prescriptions   Medication Sig Dispense Refill    PARoxetine (PAXIL) 10 mg tablet Take  by mouth daily.  busPIRone (BUSPAR) 10 mg tablet Take 10 mg by mouth three (3) times daily.  emtricitabine-tenofovir, TDF, (TRUVADA) 200-300 mg per tablet Take  by mouth daily.  gabapentin (NEURONTIN) 100 mg capsule Take  by mouth three (3) times daily.       predniSONE (STERAPRED DS) 10 mg dose pack Take as package directed 21 Tab 0    benzocaine-menthol (CEPACOL SORE THROAT, SARAH BETH-MEN,) 15-2.6 mg lozg lozenge Take 1 Lozenge by mouth every two (2) hours as needed for Sore throat. 18 Lozenge 0    chlorpheniramine-HYDROcodone (TUSSIONEX PENNKINETIC ER) 10-8 mg/5 mL suspension Take 5 mL by mouth every twelve (12) hours as needed for Cough. Max Daily Amount: 10 mL. 60 mL 0    ondansetron (ZOFRAN ODT) 4 mg disintegrating tablet Take 1 Tab by mouth every eight (8) hours as needed for Nausea. 10 Tab 0    albuterol (PROVENTIL VENTOLIN) 2.5 mg /3 mL (0.083 %) nebulizer solution 3 mL by Nebulization route every four (4) hours as needed for Wheezing or Shortness of Breath. 24 Each 0    hydrochlorothiazide (HYDRODIURIL) 25 mg tablet Take 25 mg by mouth daily. Past History     Past Medical History:  Past Medical History:   Diagnosis Date    Asthma     [facility]     [facility]     Hypertension     Sciatica        Past Surgical History:  Past Surgical History:   Procedure Laterality Date    HX OTHER SURGICAL      right hand       Family History:  Family History   Problem Relation Age of Onset    Asthma Other     Hypertension Other        Social History:  Social History   Substance Use Topics    Smoking status: Current Every Day Smoker     Packs/day: 0.50     Years: 43.00    Smokeless tobacco: Never Used    Alcohol use Yes      Comment: social        Allergies: Allergies   Allergen Reactions    Benadryl [Diphenhydramine Hcl] Myalgia    Lortab [Hydrocodone-Acetaminophen] Nausea and Vomiting    Motrin [Ibuprofen] Nausea and Vomiting    Penicillins Other (comments)     hypotension         Review of Systems     Review of Systems   Constitutional: Positive for chills. Negative for activity change, fatigue and fever. HENT: Positive for congestion and rhinorrhea.     Eyes: Negative for visual disturbance. Respiratory: Positive for cough and shortness of breath. Cardiovascular: Positive for chest pain. Negative for palpitations. Gastrointestinal: Negative for abdominal pain, diarrhea, nausea and vomiting. Genitourinary: Negative for dysuria and hematuria. Musculoskeletal: Negative for back pain. Skin: Negative for rash. Neurological: Negative for dizziness, weakness and light-headedness. Psychiatric/Behavioral: Negative for agitation. All other systems reviewed and are negative. Physical Exam     Visit Vitals    /72 (BP 1 Location: Left arm)    Pulse (!) 103    Temp 99 °F (37.2 °C)    Resp 20    Ht 5' 9\" (1.753 m)    Wt 97.5 kg (215 lb)    SpO2 100%    BMI 31.75 kg/m2     Physical Exam   Constitutional: She appears well-developed and well-nourished. No distress. HENT:   Head: Normocephalic and atraumatic. Right Ear: External ear normal.   Left Ear: External ear normal.   Nose: Nose normal.   Mouth/Throat: Oropharynx is clear and moist.   Eyes: Conjunctivae and EOM are normal. Pupils are equal, round, and reactive to light. No scleral icterus. Neck: Normal range of motion. Neck supple. No JVD present. No tracheal deviation present. No thyromegaly present. Cardiovascular: Normal rate and regular rhythm. Exam reveals no friction rub. No murmur heard. Pulmonary/Chest: No stridor. She is in respiratory distress (Mild ). She has decreased breath sounds (Bilaterally). She has wheezes (Inspiratory and expiratory ). She exhibits no tenderness. Abdominal: Soft. Bowel sounds are normal. She exhibits no distension. There is no tenderness. There is no rebound and no guarding. Musculoskeletal: Normal range of motion. She exhibits no edema or tenderness. Lymphadenopathy:     She has no cervical adenopathy. Neurological: She is alert. No cranial nerve deficit. Coordination normal.   Skin: Skin is warm and dry.    Psychiatric: She has a normal mood and affect. Her behavior is normal. Judgment and thought content normal.   Nursing note and vitals reviewed. Diagnostic Study Results     Labs -  Recent Results (from the past 12 hour(s))   EKG, 12 LEAD, INITIAL    Collection Time: 04/19/18 10:18 AM   Result Value Ref Range    Ventricular Rate 100 BPM    Atrial Rate 100 BPM    P-R Interval 140 ms    QRS Duration 78 ms    Q-T Interval 380 ms    QTC Calculation (Bezet) 490 ms    Calculated P Axis 70 degrees    Calculated R Axis 67 degrees    Calculated T Axis 57 degrees    Diagnosis       Normal sinus rhythm  Possible Left atrial enlargement  Prolonged QT  Abnormal ECG  When compared with ECG of 05-JUL-2016 20:19,  No significant change was found     CBC W/O DIFF    Collection Time: 04/19/18 10:27 AM   Result Value Ref Range    WBC 4.3 (L) 4.6 - 13.2 K/uL    RBC 4.21 4.20 - 5.30 M/uL    HGB 13.1 12.0 - 16.0 g/dL    HCT 39.4 35.0 - 45.0 %    MCV 93.6 74.0 - 97.0 FL    MCH 31.1 24.0 - 34.0 PG    MCHC 33.2 31.0 - 37.0 g/dL    RDW 13.3 11.6 - 14.5 %    PLATELET 855 609 - 213 K/uL    MPV 10.0 9.2 - 98.0 FL   METABOLIC PANEL, COMPREHENSIVE    Collection Time: 04/19/18 10:27 AM   Result Value Ref Range    Sodium 141 136 - 145 mmol/L    Potassium 3.3 (L) 3.5 - 5.5 mmol/L    Chloride 104 100 - 108 mmol/L    CO2 27 21 - 32 mmol/L    Anion gap 10 3.0 - 18 mmol/L    Glucose 119 (H) 74 - 99 mg/dL    BUN 5 (L) 7.0 - 18 MG/DL    Creatinine 0.80 0.6 - 1.3 MG/DL    BUN/Creatinine ratio 6 (L) 12 - 20      GFR est AA >60 >60 ml/min/1.73m2    GFR est non-AA >60 >60 ml/min/1.73m2    Calcium 8.3 (L) 8.5 - 10.1 MG/DL    Bilirubin, total 0.3 0.2 - 1.0 MG/DL    ALT (SGPT) 20 13 - 56 U/L    AST (SGOT) 19 15 - 37 U/L    Alk.  phosphatase 68 45 - 117 U/L    Protein, total 7.5 6.4 - 8.2 g/dL    Albumin 3.3 (L) 3.4 - 5.0 g/dL    Globulin 4.2 (H) 2.0 - 4.0 g/dL    A-G Ratio 0.8 0.8 - 1.7     INFLUENZA A & B AG (RAPID TEST)    Collection Time: 04/19/18 10:33 AM   Result Value Ref Range Influenza A Antigen NEGATIVE  NEG      Influenza B Antigen NEGATIVE  NEG         Radiologic Studies -   XR CHEST SNGL V   Final Result   IMPRESSION:     No acute cardiopulmonary disease. Medical Decision Making   I am the first provider for this patient. I reviewed the vital signs, available nursing notes, past medical history, past surgical history, family history and social history. Vital Signs-Reviewed the patient's vital signs. Pulse Oximetry Analysis -  100% on room air (Interpretation)    Cardiac Monitor:  Rate: 103bpm  Rhythm:  Sinus Tachycardia     EKG: Interpreted by the EP. Time Interpreted: 10:18AM   Rate: 100   Rhythm: Normal Sinus Rhythm    Interpretation: Normal Intervals, QTCs is 490 miliseconds; No signs of ischemia    Comparison: When compared with EKg of 7/5/16, no significant change was found. Records Reviewed: Nursing Notes, Old Medical Records and Triage notes  (Time of Review: 10:24 AM)    ED Course: Progress Notes, Reevaluation, and Consults:  12:51PM  Patient was evaluated. After treatment of Magnesium, and Solumedrol, the patient is not improving. Patient continues to be SOB. Patient filled outpatient therapy with her own nebulizer. Will continue to have inspiratory and expiratory wheezing. Will admit for Status Asthmaticus. Consult:  Discussed care with Dr. Elyse Toney, Hospitalist Standard discussion; including history of patients chief complaint, available diagnostic results, and treatment course. Accepts the patient for admission. 12:57 PM    2:18PM  patient requests more treamtent as she is getting more SOB. Patient is admitted to the hospitalist, and is awaiting for a bed. I will give additional treatment. Patient requests magnesium. It has been ordered     No chest pain no vomiting no diarrhea in the emergency department.   We will continue help with management the patient while in the emergency department    Provider Notes (Medical Decision Making):   Patient presents with SOB and cough, which is similar to prior asthmatic episodes. CD4 count is good (>1100). Will rule out PNA on CXR. Will start aggressive asthma treatment. Will rule out influenza. Will re-evaluate and consider admission, considering that the patient has significant worsening SOB, and her nebulizer treatments are not working at all. Procedures: None     Core Measures: None     CRITICAL CARE:  10:27 AM  I have spent 30 minutes of critical care time involved in lab review, consultations with specialist, family decision-making, and documentation. During this entire length of time I was immediately available to the patient. Critical Care: The reason for providing this level of medical care for this critically ill patient was due a critical illness that impaired one or more vital organ systems such that there was a high probability of imminent or life threatening deterioration in the patients condition. This care involved high complexity decision making to assess, manipulate, and support vital system functions, to treat this degreee vital organ system failure and to prevent further life threatening deterioration of the patients condition. For Hospitalized Patients:    1. Hospitalization Decision Time:  The decision to hospitalize the patient was made by Dr. Aaron Maldonado at 12:51PM on 4/19/2018    2. Aspirin: Aspirin was not given because the patient did not present with a stroke at the time of their Emergency Department evaluation    Diagnosis     Clinical Impression:   1. SOB (shortness of breath)    2. Cough    3. Severe persistent asthma with status asthmaticus    4.  [facility]        Disposition: Admission     Follow-up Information     None           Patient's Medications   Start Taking    No medications on file   Continue Taking    ALBUTEROL (PROVENTIL VENTOLIN) 2.5 MG /3 ML (0.083 %) NEBULIZER SOLUTION    3 mL by Nebulization route every four (4) hours as needed for Wheezing or Shortness of Breath. BENZOCAINE-MENTHOL (CEPACOL SORE THROAT, SARAH BETH-MEN,) 15-2.6 MG LOZG LOZENGE    Take 1 Lozenge by mouth every two (2) hours as needed for Sore throat. BUSPIRONE (BUSPAR) 10 MG TABLET    Take 10 mg by mouth three (3) times daily. CHLORPHENIRAMINE-HYDROCODONE (TUSSIONEX PENNKINETIC ER) 10-8 MG/5 ML SUSPENSION    Take 5 mL by mouth every twelve (12) hours as needed for Cough. Max Daily Amount: 10 mL. EMTRICITABINE-TENOFOVIR, TDF, (TRUVADA) 200-300 MG PER TABLET    Take  by mouth daily. GABAPENTIN (NEURONTIN) 100 MG CAPSULE    Take  by mouth three (3) times daily. HYDROCHLOROTHIAZIDE (HYDRODIURIL) 25 MG TABLET    Take 25 mg by mouth daily. ONDANSETRON (ZOFRAN ODT) 4 MG DISINTEGRATING TABLET    Take 1 Tab by mouth every eight (8) hours as needed for Nausea. PAROXETINE (PAXIL) 10 MG TABLET    Take  by mouth daily. PREDNISONE (STERAPRED DS) 10 MG DOSE PACK    Take as package directed   These Medications have changed    No medications on file   Stop Taking    No medications on file     _______________________________    Attestations:  Jayro Heredia [id number] acting as a scribe for and in the presence of Daryl Vann MD      April 19, 2018 at 10:24 AM       Provider Attestation:      I personally performed the services described in the documentation, reviewed the documentation, as recorded by the scribe in my presence, and it accurately and completely records my words and actions.  April 19, 2018 at 10:24 AM - Daryl Vann MD    _______________________________

## 2018-04-20 VITALS
SYSTOLIC BLOOD PRESSURE: 133 MMHG | HEIGHT: 69 IN | OXYGEN SATURATION: 95 % | RESPIRATION RATE: 16 BRPM | WEIGHT: 215 LBS | TEMPERATURE: 97.7 F | BODY MASS INDEX: 31.84 KG/M2 | DIASTOLIC BLOOD PRESSURE: 81 MMHG | HEART RATE: 90 BPM

## 2018-04-20 LAB
ALBUMIN SERPL-MCNC: 3.3 G/DL (ref 3.4–5)
ALBUMIN/GLOB SERPL: 0.9 {RATIO} (ref 0.8–1.7)
ALP SERPL-CCNC: 70 U/L (ref 45–117)
ALT SERPL-CCNC: 20 U/L (ref 13–56)
ANION GAP SERPL CALC-SCNC: 13 MMOL/L (ref 3–18)
AST SERPL-CCNC: 16 U/L (ref 15–37)
BASOPHILS # BLD: 0 K/UL (ref 0–0.06)
BASOPHILS NFR BLD: 0 % (ref 0–2)
BILIRUB SERPL-MCNC: 0.1 MG/DL (ref 0.2–1)
BUN SERPL-MCNC: 11 MG/DL (ref 7–18)
BUN/CREAT SERPL: 13 (ref 12–20)
CALCIUM SERPL-MCNC: 8.3 MG/DL (ref 8.5–10.1)
CHLORIDE SERPL-SCNC: 105 MMOL/L (ref 100–108)
CO2 SERPL-SCNC: 22 MMOL/L (ref 21–32)
CREAT SERPL-MCNC: 0.85 MG/DL (ref 0.6–1.3)
DIFFERENTIAL METHOD BLD: ABNORMAL
EOSINOPHIL # BLD: 0 K/UL (ref 0–0.4)
EOSINOPHIL NFR BLD: 0 % (ref 0–5)
ERYTHROCYTE [DISTWIDTH] IN BLOOD BY AUTOMATED COUNT: 13.2 % (ref 11.6–14.5)
EST. AVERAGE GLUCOSE BLD GHB EST-MCNC: 131 MG/DL
GLOBULIN SER CALC-MCNC: 3.5 G/DL (ref 2–4)
GLUCOSE SERPL-MCNC: 219 MG/DL (ref 74–99)
HBA1C MFR BLD: 6.2 % (ref 4.2–5.6)
HCT VFR BLD AUTO: 39.4 % (ref 35–45)
HGB BLD-MCNC: 12.9 G/DL (ref 12–16)
LYMPHOCYTES # BLD: 0.9 K/UL (ref 0.9–3.6)
LYMPHOCYTES NFR BLD: 17 % (ref 21–52)
MCH RBC QN AUTO: 31 PG (ref 24–34)
MCHC RBC AUTO-ENTMCNC: 32.7 G/DL (ref 31–37)
MCV RBC AUTO: 94.7 FL (ref 74–97)
MONOCYTES # BLD: 0.3 K/UL (ref 0.05–1.2)
MONOCYTES NFR BLD: 5 % (ref 3–10)
NEUTS SEG # BLD: 3.9 K/UL (ref 1.8–8)
NEUTS SEG NFR BLD: 78 % (ref 40–73)
PLATELET # BLD AUTO: 286 K/UL (ref 135–420)
PMV BLD AUTO: 10.4 FL (ref 9.2–11.8)
POTASSIUM SERPL-SCNC: 4.1 MMOL/L (ref 3.5–5.5)
PROT SERPL-MCNC: 6.8 G/DL (ref 6.4–8.2)
RBC # BLD AUTO: 4.16 M/UL (ref 4.2–5.3)
SODIUM SERPL-SCNC: 140 MMOL/L (ref 136–145)
TSH SERPL DL<=0.05 MIU/L-ACNC: 0.34 UIU/ML (ref 0.36–3.74)
WBC # BLD AUTO: 5 K/UL (ref 4.6–13.2)

## 2018-04-20 PROCEDURE — 84443 ASSAY THYROID STIM HORMONE: CPT | Performed by: HOSPITALIST

## 2018-04-20 PROCEDURE — 85025 COMPLETE CBC W/AUTO DIFF WBC: CPT | Performed by: HOSPITALIST

## 2018-04-20 PROCEDURE — 74011250637 HC RX REV CODE- 250/637: Performed by: EMERGENCY MEDICINE

## 2018-04-20 PROCEDURE — 74011000250 HC RX REV CODE- 250: Performed by: HOSPITALIST

## 2018-04-20 PROCEDURE — 83036 HEMOGLOBIN GLYCOSYLATED A1C: CPT | Performed by: HOSPITALIST

## 2018-04-20 PROCEDURE — 36415 COLL VENOUS BLD VENIPUNCTURE: CPT | Performed by: HOSPITALIST

## 2018-04-20 PROCEDURE — 74011250637 HC RX REV CODE- 250/637: Performed by: HOSPITALIST

## 2018-04-20 PROCEDURE — 80053 COMPREHEN METABOLIC PANEL: CPT | Performed by: HOSPITALIST

## 2018-04-20 PROCEDURE — 74011250636 HC RX REV CODE- 250/636: Performed by: HOSPITALIST

## 2018-04-20 RX ORDER — INSULIN LISPRO 100 [IU]/ML
INJECTION, SOLUTION INTRAVENOUS; SUBCUTANEOUS
Status: DISCONTINUED | OUTPATIENT
Start: 2018-04-20 | End: 2018-04-20 | Stop reason: HOSPADM

## 2018-04-20 RX ORDER — METHYLPREDNISOLONE 4 MG/1
4 TABLET ORAL
Qty: 1 DOSE PACK | Refills: 0 | OUTPATIENT
Start: 2018-04-20 | End: 2020-05-25

## 2018-04-20 RX ORDER — IBUPROFEN 200 MG
1 TABLET ORAL DAILY
Qty: 30 PATCH | Refills: 0 | Status: SHIPPED | OUTPATIENT
Start: 2018-04-21 | End: 2018-05-21

## 2018-04-20 RX ORDER — LANOLIN ALCOHOL/MO/W.PET/CERES
3 CREAM (GRAM) TOPICAL ONCE
Status: COMPLETED | OUTPATIENT
Start: 2018-04-20 | End: 2018-04-20

## 2018-04-20 RX ORDER — OMEPRAZOLE 20 MG/1
20 CAPSULE, DELAYED RELEASE ORAL DAILY
Qty: 30 CAP | Refills: 5 | Status: SHIPPED | OUTPATIENT
Start: 2018-04-20

## 2018-04-20 RX ORDER — DEXTROSE MONOHYDRATE 25 G/50ML
25-50 INJECTION, SOLUTION INTRAVENOUS AS NEEDED
Status: DISCONTINUED | OUTPATIENT
Start: 2018-04-20 | End: 2018-04-20 | Stop reason: HOSPADM

## 2018-04-20 RX ORDER — MAGNESIUM SULFATE 100 %
4 CRYSTALS MISCELLANEOUS AS NEEDED
Status: DISCONTINUED | OUTPATIENT
Start: 2018-04-20 | End: 2018-04-20 | Stop reason: HOSPADM

## 2018-04-20 RX ORDER — IBUPROFEN 200 MG
1 TABLET ORAL DAILY
Status: DISCONTINUED | OUTPATIENT
Start: 2018-04-20 | End: 2018-04-20 | Stop reason: HOSPADM

## 2018-04-20 RX ADMIN — ARFORMOTEROL TARTRATE 15 MCG: 15 SOLUTION RESPIRATORY (INHALATION) at 08:23

## 2018-04-20 RX ADMIN — BUDESONIDE 500 MCG: 0.5 INHALANT RESPIRATORY (INHALATION) at 08:23

## 2018-04-20 RX ADMIN — MELATONIN TAB 3 MG 3 MG: 3 TAB at 02:40

## 2018-04-20 RX ADMIN — IPRATROPIUM BROMIDE AND ALBUTEROL SULFATE 3 ML: .5; 3 SOLUTION RESPIRATORY (INHALATION) at 08:23

## 2018-04-20 RX ADMIN — BUTALBITAL, ACETAMINOPHEN AND CAFFEINE 1 CAPSULE: 300; 40; 50 CAPSULE ORAL at 08:27

## 2018-04-20 RX ADMIN — IPRATROPIUM BROMIDE AND ALBUTEROL SULFATE 3 ML: .5; 3 SOLUTION RESPIRATORY (INHALATION) at 02:40

## 2018-04-20 RX ADMIN — BUSPIRONE HYDROCHLORIDE 10 MG: 5 TABLET ORAL at 08:27

## 2018-04-20 RX ADMIN — PAROXETINE HYDROCHLORIDE 10 MG: 20 TABLET, FILM COATED ORAL at 08:27

## 2018-04-20 RX ADMIN — GABAPENTIN 100 MG: 100 CAPSULE ORAL at 08:27

## 2018-04-20 RX ADMIN — METHYLPREDNISOLONE SODIUM SUCCINATE 20 MG: 125 INJECTION, POWDER, FOR SOLUTION INTRAMUSCULAR; INTRAVENOUS at 08:27

## 2018-04-20 RX ADMIN — GUAIFENESIN 600 MG: 600 TABLET, EXTENDED RELEASE ORAL at 08:27

## 2018-04-20 NOTE — PROGRESS NOTES
Bedside and Verbal shift change report given to Cindy Hill RN (oncoming nurse) by Es Hahn RN (offgoing nurse). Report included the following information SBAR, Kardex and MAR. Assessment completed. AAOX4, calm and cooperative. Bed locked and at lowest position. Call bell within reach. Patient resting in bed at this time. 2150-  Patient c/o headache 7/10 patient stated, \"Tylenol don't help\". Patient also requested for a nicotine patch. Patient stated she smokes a 1/2 to a whole pack of cigarettes a day. Will notify the doctor. 2200-  Spoke with Dr. Elaine Luz in regards to patient's headache also mentioned Tylenol did not help her pain and notified doctor in regards to smoking cessation. Doctor will order Leobardo Langton. Doctor ordered Nicoderm 14 mg transdermal daily. Trb.    [id number]  Patient c/o difficulty sleeping. Patient stated she takes a sleep aid with her \"psych meds\" every night. .  Will notify doctor. Raf Wooten  Notified Dr. Paula Lara in regards to patient having difficulty sleeping. Doctor ordered melantonin 3 mg po once. trb    0740-Bedside and Verbal shift change report given to Nanette Jacques RN (oncoming nurse) by Cindy iHll RN (offgoing nurse). Report included the following information SBAR, Kardex and MAR. Patient resting in bed.

## 2018-04-20 NOTE — DISCHARGE SUMMARY
History and Physical    Patient: Farhat Duncan               Sex: female          DOA: 4/19/2018       YOB: 1960      Age:  62 y.o.        LOS:  LOS: 1 day        HPI:     Farhat Duncan is a 62 y.o. female who was admitted to the hospital because of an exacerbation of her asthma . The pt has a chronic cough and takes tussionex on a regular basis idiopathic thrombocytopenic purpura was explained to the patient that she she has esophageal reflux with aspiration resulting in an exacerbation of her asthma . She was told to take protonix or prilosec as an outpatient . The pt also smokes cigarettes  Further exacerbating her reflux  . Indeed has last asthma exacerbation before this was a month ago . The pt has hiv and is on genvoya. She is [facility] by [facility] . Her cd4 count is 1100. The pt also has depression . .she has multiple tattoos on her face  and chest       Past Medical History:   Diagnosis Date    Asthma     [facility] [address]     [facility] immunodeficiency [facility]) ([facility]     Hypertension     Sciatica        Social History:   Tobacco use:1/2 pack per day    Alcohol use:none   Occupation:none    Family History:has one child       Review of Systems    Constitutional:  No fever or weight loss  HEENT:  No headache or visual changes  Cardiovascular:  No chest pain or diaphoresis  Respiratory:  No coughing, wheezing, or shortness of breath. GI:  No nausea or vomitting.   No diarrhea  :  No hematuria or dysuria  Skin:  No rashes or moles  Neuro:  No seizures or syncope  Hematological:  No bruising or bleeding  Endocrine:  No diabetes or thyroid disease    Physical Exam:      Visit Vitals    /81 (BP 1 Location: Right arm, BP Patient Position: At rest)    Pulse 90    Temp 97.7 °F (36.5 °C)    Resp 16    Ht 5' 9\" (1.753 m)    Wt 97.5 kg (215 lb)    SpO2 95%    BMI 31.75 kg/m2       Physical Exam:    Gen:  No distress, alert  HEENT:  Normal cephalic atraumatic, extra-occular movements are intact. Neck:  Supple, No JVD  Lungs:expiratory wheezing  Heart:  Regular Rate and Rhythm, normal S1 and S2, no edema  Abdomen:  Soft, non tender, normal bowel sounds, no guarding. Extremities:  Well perfused, no cyanosis or edema  Neurological:  Awake and alert, CN's are intact, normal strength throughout extremities  Skin:  No rashes or moles  Mental Status:  Normal thought process, does not appear anxious  Laboratory Studies:    BMP:   Lab Results   Component Value Date/Time     04/20/2018 04:05 AM    K 4.1 04/20/2018 04:05 AM     04/20/2018 04:05 AM    CO2 22 04/20/2018 04:05 AM    AGAP 13 04/20/2018 04:05 AM     (H) 04/20/2018 04:05 AM    BUN 11 04/20/2018 04:05 AM    CREA 0.85 04/20/2018 04:05 AM    GFRAA >60 04/20/2018 04:05 AM    GFRNA >60 04/20/2018 04:05 AM     CMP:   Lab Results   Component Value Date/Time     04/20/2018 04:05 AM    K 4.1 04/20/2018 04:05 AM     04/20/2018 04:05 AM    CO2 22 04/20/2018 04:05 AM    AGAP 13 04/20/2018 04:05 AM     (H) 04/20/2018 04:05 AM    BUN 11 04/20/2018 04:05 AM    CREA 0.85 04/20/2018 04:05 AM    GFRAA >60 04/20/2018 04:05 AM    GFRNA >60 04/20/2018 04:05 AM    CA 8.3 (L) 04/20/2018 04:05 AM    ALB 3.3 (L) 04/20/2018 04:05 AM    TP 6.8 04/20/2018 04:05 AM    GLOB 3.5 04/20/2018 04:05 AM    AGRAT 0.9 04/20/2018 04:05 AM    SGOT 16 04/20/2018 04:05 AM    ALT 20 04/20/2018 04:05 AM     CBC:   Lab Results   Component Value Date/Time    WBC 5.0 04/20/2018 04:05 AM    HGB 12.9 04/20/2018 04:05 AM    HCT 39.4 04/20/2018 04:05 AM     04/20/2018 04:05 AM       Assessment/Plan     Active Problems:    Asthma exacerbation (4/19/2018)probably due to esophageal reflux with aspiration   Chronic cough probably due to esophageal reflux with aspiration  hiv positive . cd4 count is 1100  Multiple tattoos     PLAN:will dc today pt is to follow up with her pcp her asthma is related to her esophageal reflux and made worse by smoking cigarettes

## 2018-04-20 NOTE — DISCHARGE INSTRUCTIONS
Asthma Attack: Care Instructions  Your Care Instructions    During an asthma attack, the airways swell and narrow. This makes it hard to breathe. Severe asthma attacks can be life-threatening, but you can help prevent them by keeping your asthma under control and treating symptoms before they get bad. Symptoms include being short of breath, having chest tightness, coughing, and wheezing. Noting and treating these symptoms can also help you avoid future trips to the emergency room. The doctor has checked you carefully, but problems can develop later. If you notice any problems or new symptoms, get medical treatment right away. Follow-up care is a key part of your treatment and safety. Be sure to make and go to all appointments, and call your doctor if you are having problems. It's also a good idea to know your test results and keep a list of the medicines you take. How can you care for yourself at home? · Follow your asthma action plan to prevent and treat attacks. If you don't have an asthma action plan, work with your doctor to create one. · Take your asthma medicines exactly as prescribed. Talk to your doctor right away if you have any questions about how to take them. ¨ Use your quick-relief medicine when you have symptoms of an attack. Quick-relief medicine is usually an albuterol inhaler. Some people need to use quick-relief medicine before they exercise. ¨ Take your controller medicine every day, not just when you have symptoms. Controller medicine is usually an inhaled corticosteroid. The goal is to prevent problems before they occur. Don't use your controller medicine to treat an attack that has already started. It doesn't work fast enough to help. ¨ If your doctor prescribed corticosteroid pills to use during an attack, take them exactly as prescribed. It may take hours for the pills to work, but they may make the episode shorter and help you breathe better.   ¨ Keep your quick-relief medicine with you at all times. · Talk to your doctor before using other medicines. Some medicines, such as aspirin, can cause asthma attacks in some people. · If you have a peak flow meter, use it to check how well you are breathing. This can help you predict when an asthma attack is going to occur. Then you can take medicine to prevent the asthma attack or make it less severe. · Do not smoke or allow others to smoke around you. Avoid smoky places. Smoking makes asthma worse. If you need help quitting, talk to your doctor about stop-smoking programs and medicines. These can increase your chances of quitting for good. · Learn what triggers an asthma attack for you, and avoid the triggers when you can. Common triggers include colds, smoke, air pollution, dust, pollen, mold, pets, cockroaches, stress, and cold air. · Avoid colds and the flu. Get a pneumococcal vaccine shot. If you have had one before, ask your doctor if you need a second dose. Get a flu vaccine every fall. If you must be around people with colds or the flu, wash your hands often. When should you call for help? Call 911 anytime you think you may need emergency care. For example, call if:  ? · You have severe trouble breathing. ?Call your doctor now or seek immediate medical care if:  ? · Your symptoms do not get better after you have followed your asthma action plan. ? · You have new or worse trouble breathing. ? · Your coughing and wheezing get worse. ? · You cough up dark brown or bloody mucus (sputum). ? · You have a new or higher fever. ? Watch closely for changes in your health, and be sure to contact your doctor if:  ? · You need to use quick-relief medicine on more than 2 days a week (unless it is just for exercise). ? · You cough more deeply or more often, especially if you notice more mucus or a change in the color of your mucus. ? · You are not getting better as expected. Where can you learn more?   Go to http://jessica-christina.info/. Enter N765 in the search box to learn more about \"Asthma Attack: Care Instructions. \"  Current as of: May 12, 2017  Content Version: 11.4  © 8929-9701 [employer], [employer]. Care instructions adapted under license by [employer] (which disclaims liability or warranty for this information). If you have questions about a medical condition or this instruction, always ask your healthcare professional. [place] [address] any warranty or liability for your use of this information.

## 2018-04-20 NOTE — PROGRESS NOTES
Problem: Falls - Risk of  Goal: *Absence of Falls  Document Vel Fall Risk and appropriate interventions in the flowsheet.    Outcome: Progressing Towards Goal  Fall Risk Interventions:                 Elimination Interventions: Call light in reach, Patient to call for help with toileting needs

## 2018-07-03 ENCOUNTER — HOSPITAL ENCOUNTER (EMERGENCY)
Age: 58
Discharge: HOME | End: 2018-07-03
Attending: EMERGENCY MEDICINE
Payer: MEDICAID

## 2018-07-03 VITALS
DIASTOLIC BLOOD PRESSURE: 107 MMHG | HEART RATE: 102 BPM | TEMPERATURE: 97.9 F | BODY MASS INDEX: 33.62 KG/M2 | OXYGEN SATURATION: 97 % | RESPIRATION RATE: 18 BRPM | SYSTOLIC BLOOD PRESSURE: 153 MMHG | WEIGHT: 227 LBS | HEIGHT: 69 IN

## 2018-07-03 DIAGNOSIS — M54.41 ACUTE BILATERAL LOW BACK PAIN WITH RIGHT-SIDED SCIATICA: Primary | ICD-10-CM

## 2018-07-03 PROCEDURE — 99283 EMERGENCY DEPT VISIT LOW MDM: CPT

## 2018-07-03 PROCEDURE — 74011250637 HC RX REV CODE- 250/637: Performed by: PHYSICIAN ASSISTANT

## 2018-07-03 RX ORDER — OXYCODONE AND ACETAMINOPHEN 5; 325 MG/1; MG/1
TABLET ORAL
Qty: 6 TAB | Refills: 0 | Status: SHIPPED | OUTPATIENT
Start: 2018-07-03

## 2018-07-03 RX ORDER — OXYCODONE AND ACETAMINOPHEN 5; 325 MG/1; MG/1
1 TABLET ORAL
Status: COMPLETED | OUTPATIENT
Start: 2018-07-03 | End: 2018-07-03

## 2018-07-03 RX ORDER — CYCLOBENZAPRINE HCL 10 MG
10 TABLET ORAL
Qty: 12 TAB | Refills: 0 | OUTPATIENT
Start: 2018-07-03 | End: 2020-05-25

## 2018-07-03 RX ADMIN — OXYCODONE HYDROCHLORIDE AND ACETAMINOPHEN 1 TABLET: 5; 325 TABLET ORAL at 19:40

## 2018-07-03 NOTE — DISCHARGE INSTRUCTIONS

## 2018-07-03 NOTE — ED TRIAGE NOTES
Pt c/o sciatic pain since last night without relief, states \"i have to come to the ER when this happens because my primary wont give me anything for it\"

## 2018-07-03 NOTE — ED NOTES
7:49 PM  07/03/18     Discharge instructions given to patient (name) with verbalization of understanding. Patient accompanied by self. Patient discharged with the following prescriptions Percocet, Flexeril . Patient discharged to home (destination).       Emmanuel Niño RN

## 2018-07-03 NOTE — ED PROVIDER NOTES
EMERGENCY DEPARTMENT HISTORY AND PHYSICAL EXAM    Date: 7/3/2018  Patient Name: Xavi Garcia    History of Presenting Illness     Chief Complaint   Patient presents with    Leg Pain         History Provided By: Patient    Chief Complaint: Leg Pain   Duration: 1 day ago  Timing:  Worsening  Location: Bilateral legs   Quality: Shooting   Severity: 10 out of 10  Modifying Factors: Notes that the pain is exacerbated with movement. Denies the use of OTC medications for the pain. Associated Symptoms: denies any other associated signs or symptoms      Additional History (Context): Xavi Garcia is a 62 y.o. female with PMHx of Sciatica, Asthma, HTN , and HIV who presents with lumbar back pain that radiates down to her bilateral legs onset 1 day ago last night. Reports that this is her sciatic pain, of which she has a prior history. States that usually, the pain radiates into her right lower extremity, but with this current presentation, the pain is also radiating into her left lower extremity, which she states is different. Notes that the pain is exacerbated with movement. Denies the use of OTC medications for the pain. Denies any abnormal or excessive activity prior to the onset. No saddle anesthesia or incontinence. No other concerns were expressed at this time. PCP: Alexander Escobedo MD    Current Outpatient Prescriptions   Medication Sig Dispense Refill    oxyCODONE-acetaminophen (PERCOCET) 5-325 mg per tablet Take 1 tablet every 4-6 hours as needed for pain control. If you were instructed to try over the counter ibuprofen or tylenol, only take the percocet for pain not controlled with the over the counter medication. 6 Tab 0    cyclobenzaprine (FLEXERIL) 10 mg tablet Take 1 Tab by mouth three (3) times daily as needed for Muscle Spasm(s). 12 Tab 0    omeprazole (PRILOSEC) 20 mg capsule Take 1 Cap by mouth daily.  Indications: gastroesophageal reflux disease 30 Cap 5    methylPREDNISolone (MEDROL, PAPITO,) 4 mg tablet Take 1 Tab by mouth Specific Days and Specific Times. Take as directed 1 Dose Pack 0    PARoxetine (PAXIL) 10 mg tablet Take  by mouth daily.  busPIRone (BUSPAR) 10 mg tablet Take 10 mg by mouth three (3) times daily.  gabapentin (NEURONTIN) 100 mg capsule Take  by mouth three (3) times daily.  benzocaine-menthol (CEPACOL SORE THROAT, SARAH BETH-MEN,) 15-2.6 mg lozg lozenge Take 1 Lozenge by mouth every two (2) hours as needed for Sore throat. 18 Lozenge 0    ondansetron (ZOFRAN ODT) 4 mg disintegrating tablet Take 1 Tab by mouth every eight (8) hours as needed for Nausea. 10 Tab 0    albuterol (PROVENTIL VENTOLIN) 2.5 mg /3 mL (0.083 %) nebulizer solution 3 mL by Nebulization route every four (4) hours as needed for Wheezing or Shortness of Breath. 24 Each 0    hydrochlorothiazide (HYDRODIURIL) 25 mg tablet Take 25 mg by mouth daily. Past History     Past Medical History:  Past Medical History:   Diagnosis Date    Asthma     [facility]     [facility]) [facility]     Hypertension     Sciatica        Past Surgical History:  Past Surgical History:   Procedure Laterality Date    HX OTHER SURGICAL      right hand       Family History:  Family History   Problem Relation Age of Onset    Asthma Other     Hypertension Other        Social History:  Social History   Substance Use Topics    Smoking status: Current Every Day Smoker     Packs/day: 0.50     Years: 43.00    Smokeless tobacco: Never Used    Alcohol use Yes      Comment: social        Allergies: Allergies   Allergen Reactions    Benadryl [Diphenhydramine Hcl] Myalgia    Lortab [Hydrocodone-Acetaminophen] Nausea and Vomiting    Motrin [Ibuprofen] Nausea and Vomiting    Penicillins Other (comments)     hypotension         Review of Systems   Review of Systems   Constitutional: Negative for fever.    Musculoskeletal: Positive for myalgias (bilateral leg pain ). All other systems reviewed and are negative. All Other Systems Negative  Physical Exam     Vitals:    07/03/18 1931 07/03/18 1942   BP: (!) 153/107    Pulse: (!) 124 (!) 102   Resp: 18    Temp: 97.9 °F (36.6 °C)    SpO2: 97%    Weight: 103 kg (227 lb)    Height: 5' 9\" (1.753 m)      Physical Exam   Constitutional: She is oriented to person, place, and time. She appears well-developed and well-nourished. No distress. HENT:   Head: Normocephalic and atraumatic. Facial tattoos   Eyes: Conjunctivae are normal.   Neck: Normal range of motion. Neck supple. Cardiovascular: Normal rate, regular rhythm and normal heart sounds. Pulmonary/Chest: Effort normal and breath sounds normal. No respiratory distress. She has no wheezes. She has no rales. Musculoskeletal: Normal range of motion. Moderate bilateral lumbar paraspinal tenderness with radiation down into right SI joint. No midline tenderness. Ambulating with assistance of walking stick. Neurological: She is alert and oriented to person, place, and time. Skin: Skin is warm and dry. Psychiatric: She has a normal mood and affect. Her behavior is normal. Judgment and thought content normal.   Nursing note and vitals reviewed. Diagnostic Study Results     Labs -   No results found for this or any previous visit (from the past 12 hour(s)). Radiologic Studies -   No orders to display     CT Results  (Last 48 hours)    None        CXR Results  (Last 48 hours)    None            Medical Decision Making   I am the first provider for this patient. I reviewed the vital signs, available nursing notes, past medical history, past surgical history, family history and social history. Vital Signs-Reviewed the patient's vital signs.       Pulse Oximetry Analysis - 97 % on Room Air     Records Reviewed: Nursing Notes and Triage notes     Procedures:  Procedures    Provider Notes (Medical Decision Making):     MED RECONCILIATION:  No current facility-administered medications for this encounter. Current Outpatient Prescriptions   Medication Sig    oxyCODONE-acetaminophen (PERCOCET) 5-325 mg per tablet Take 1 tablet every 4-6 hours as needed for pain control. If you were instructed to try over the counter ibuprofen or tylenol, only take the percocet for pain not controlled with the over the counter medication.  cyclobenzaprine (FLEXERIL) 10 mg tablet Take 1 Tab by mouth three (3) times daily as needed for Muscle Spasm(s).  omeprazole (PRILOSEC) 20 mg capsule Take 1 Cap by mouth daily. Indications: gastroesophageal reflux disease    methylPREDNISolone (MEDROL, PAPITO,) 4 mg tablet Take 1 Tab by mouth Specific Days and Specific Times. Take as directed    PARoxetine (PAXIL) 10 mg tablet Take  by mouth daily.  busPIRone (BUSPAR) 10 mg tablet Take 10 mg by mouth three (3) times daily.  gabapentin (NEURONTIN) 100 mg capsule Take  by mouth three (3) times daily.  benzocaine-menthol (CEPACOL SORE THROAT, SARAH BETH-MEN,) 15-2.6 mg lozg lozenge Take 1 Lozenge by mouth every two (2) hours as needed for Sore throat.  ondansetron (ZOFRAN ODT) 4 mg disintegrating tablet Take 1 Tab by mouth every eight (8) hours as needed for Nausea.  albuterol (PROVENTIL VENTOLIN) 2.5 mg /3 mL (0.083 %) nebulizer solution 3 mL by Nebulization route every four (4) hours as needed for Wheezing or Shortness of Breath.  hydrochlorothiazide (HYDRODIURIL) 25 mg tablet Take 25 mg by mouth daily. Disposition:  discharge    DISCHARGE NOTE:     Care plan outlined and precautions discussed. All medications were reviewed with the patient; will d/c home with percocet and flexeril. All of pt's questions and concerns were addressed. Patient was instructed and agrees to follow up with PCP, as well as to return to the ED upon further deterioration. Patient is ready to go home.     Follow-up Information     Follow up With Details Comments Contact Info    Jeniffer Seaman MD Call To make a follow up appointment [id number] [facility] [phone] [phone]      [facility] EMERGENCY DEPT  Immediately if symptoms worsen [address]  [phone]          Discharge Medication List as of 7/3/2018  7:47 PM      START taking these medications    Details   oxyCODONE-acetaminophen (PERCOCET) 5-325 mg per tablet Take 1 tablet every 4-6 hours as needed for pain control. If you were instructed to try over the counter ibuprofen or tylenol, only take the percocet for pain not controlled with the over the counter medication. , Print, Disp-6 Tab, R-0      cyclobenzaprine (FLEXERIL) 10 mg tablet Take 1 Tab by mouth three (3) times daily as needed for Muscle Spasm(s). , Print, Disp-12 Tab, R-0         CONTINUE these medications which have NOT CHANGED    Details   omeprazole (PRILOSEC) 20 mg capsule Take 1 Cap by mouth daily. Indications: gastroesophageal reflux disease, Print, Disp-30 Cap, R-5      methylPREDNISolone (MEDROL, PAPITO,) 4 mg tablet Take 1 Tab by mouth Specific Days and Specific Times. Take as directed, Print, Disp-1 Dose Pack, R-0      PARoxetine (PAXIL) 10 mg tablet Take  by mouth daily. , Historical Med      busPIRone (BUSPAR) 10 mg tablet Take 10 mg by mouth three (3) times daily. , Historical Med      gabapentin (NEURONTIN) 100 mg capsule Take  by mouth three (3) times daily. , Historical Med      benzocaine-menthol (CEPACOL SORE THROAT, SARAH BETH-MEN,) 15-2.6 mg lozg lozenge Take 1 Lozenge by mouth every two (2) hours as needed for Sore throat., Print, Disp-18 Lozenge, R-0      ondansetron (ZOFRAN ODT) 4 mg disintegrating tablet Take 1 Tab by mouth every eight (8) hours as needed for Nausea. , Print, Disp-10 Tab, R-0      albuterol (PROVENTIL VENTOLIN) 2.5 mg /3 mL (0.083 %) nebulizer solution 3 mL by Nebulization route every four (4) hours as needed for Wheezing or Shortness of Breath., Print, Disp-24 Each, R-0 hydrochlorothiazide (HYDRODIURIL) 25 mg tablet Take 25 mg by mouth daily. , Historical Med             Diagnosis     Clinical Impression:   1. Acute bilateral low back pain with right-sided sciatica            Scribe Attestation     [employer] acting as a scribe for and in the presence of Chandrakant Alexander       July 03, 2018 at 7:32 PM       Provider Attestation:      I personally performed the services described in the documentation, reviewed the documentation, as recorded by the scribe in my presence, and it accurately and completely records my words and actions.  July 03, 2018 at 7:32 PM - Naman Snider PA-C

## 2018-07-06 ENCOUNTER — APPOINTMENT (OUTPATIENT)
Dept: GENERAL RADIOLOGY | Age: 58
End: 2018-07-06
Attending: PHYSICIAN ASSISTANT
Payer: MEDICAID

## 2018-07-06 ENCOUNTER — HOSPITAL ENCOUNTER (EMERGENCY)
Age: 58
Discharge: HOME | End: 2018-07-06
Attending: EMERGENCY MEDICINE | Admitting: EMERGENCY MEDICINE
Payer: MEDICAID

## 2018-07-06 VITALS
BODY MASS INDEX: 34.07 KG/M2 | HEIGHT: 69 IN | WEIGHT: 230 LBS | DIASTOLIC BLOOD PRESSURE: 106 MMHG | OXYGEN SATURATION: 99 % | SYSTOLIC BLOOD PRESSURE: 156 MMHG | TEMPERATURE: 97.9 F | RESPIRATION RATE: 14 BRPM | HEART RATE: 111 BPM

## 2018-07-06 DIAGNOSIS — G89.29 CHRONIC RIGHT-SIDED LOW BACK PAIN WITH RIGHT-SIDED SCIATICA: Primary | ICD-10-CM

## 2018-07-06 DIAGNOSIS — M54.41 CHRONIC RIGHT-SIDED LOW BACK PAIN WITH RIGHT-SIDED SCIATICA: Primary | ICD-10-CM

## 2018-07-06 PROCEDURE — 74011250637 HC RX REV CODE- 250/637: Performed by: PHYSICIAN ASSISTANT

## 2018-07-06 PROCEDURE — 72110 X-RAY EXAM L-2 SPINE 4/>VWS: CPT

## 2018-07-06 PROCEDURE — 74011636637 HC RX REV CODE- 636/637: Performed by: PHYSICIAN ASSISTANT

## 2018-07-06 PROCEDURE — 74011250636 HC RX REV CODE- 250/636: Performed by: PHYSICIAN ASSISTANT

## 2018-07-06 PROCEDURE — 99283 EMERGENCY DEPT VISIT LOW MDM: CPT

## 2018-07-06 PROCEDURE — 96372 THER/PROPH/DIAG INJ SC/IM: CPT

## 2018-07-06 RX ORDER — KETOROLAC TROMETHAMINE 10 MG/1
10 TABLET, FILM COATED ORAL
Qty: 10 TAB | Refills: 0 | Status: SHIPPED | OUTPATIENT
Start: 2018-07-06

## 2018-07-06 RX ORDER — PREDNISONE 50 MG/1
50 TABLET ORAL DAILY
Qty: 5 TAB | Refills: 0 | Status: SHIPPED | OUTPATIENT
Start: 2018-07-06 | End: 2018-07-11

## 2018-07-06 RX ORDER — PREDNISONE 20 MG/1
60 TABLET ORAL
Status: COMPLETED | OUTPATIENT
Start: 2018-07-06 | End: 2018-07-06

## 2018-07-06 RX ORDER — OXYCODONE AND ACETAMINOPHEN 5; 325 MG/1; MG/1
1 TABLET ORAL
Status: COMPLETED | OUTPATIENT
Start: 2018-07-06 | End: 2018-07-06

## 2018-07-06 RX ORDER — CYCLOBENZAPRINE HCL 5 MG
10 TABLET ORAL 3 TIMES DAILY
Qty: 9 TAB | Refills: 0 | OUTPATIENT
Start: 2018-07-06 | End: 2020-05-25

## 2018-07-06 RX ORDER — KETOROLAC TROMETHAMINE 30 MG/ML
30 INJECTION, SOLUTION INTRAMUSCULAR; INTRAVENOUS
Status: COMPLETED | OUTPATIENT
Start: 2018-07-06 | End: 2018-07-06

## 2018-07-06 RX ADMIN — PREDNISONE 60 MG: 20 TABLET ORAL at 20:06

## 2018-07-06 RX ADMIN — KETOROLAC TROMETHAMINE 30 MG: 30 INJECTION, SOLUTION INTRAMUSCULAR at 20:06

## 2018-07-06 RX ADMIN — OXYCODONE HYDROCHLORIDE AND ACETAMINOPHEN 1 TABLET: 5; 325 TABLET ORAL at 20:06

## 2018-07-06 NOTE — Clinical Note
Take medications as prescribed Ice to affected area 3x daily Topical Capsacin patches Avoid inactivity which may worsen symptoms. Keep walking Follow up with PCP

## 2018-07-06 NOTE — ED TRIAGE NOTES
C/o right lower back pain that shoots down the leg and into pelvis x5 days. \"The percocet and muscle relaxers aren't working. \"

## 2018-07-07 NOTE — DISCHARGE INSTRUCTIONS

## 2018-07-07 NOTE — ED PROVIDER NOTES
EMERGENCY DEPARTMENT HISTORY AND PHYSICAL EXAM    Date: 7/6/2018  Patient Name: Judith Alvarez    History of Presenting Illness     Chief Complaint   Patient presents with    Back Pain         History Provided By: Patient    Chief Complaint: back pain, sciatica  Duration: 6 Days  Timing:  Constant  Location: low back, midline  Quality: Aching and Sharp  Severity: 10 out of 10  Modifying Factors: worse with movement  Associated Symptoms: denies any other associated signs or symptoms      Additional History (Context): Judith Alvarez is a 62 y.o. female with hypertension and sciatica who presents with a complaint of continued back pain. PT states she was seen here a few days ago and given percocet and muscle relaxants for her pain but they are not working. PT states that she usually gets different medications when she comes to the ED for her back pain but does not remember what they are. SHe states pain is midline and radiates down her right leg. SHe denies injury or fall, fever, paresthesia, anesthesia or bowel or bladder incontinence. PCP: Dejah Blake MD    Current Facility-Administered Medications   Medication Dose Route Frequency Provider Last Rate Last Dose    predniSONE (DELTASONE) tablet 60 mg  60 mg Oral NOW GINNY Urias        ketorolac (TORADOL) injection 30 mg  30 mg IntraMUSCular NOW GINNY Erickson        oxyCODONE-acetaminophen (PERCOCET) 5-325 mg per tablet 1 Tab  1 Tab Oral NOW GINNY Urias         Current Outpatient Prescriptions   Medication Sig Dispense Refill    ketorolac (TORADOL) 10 mg tablet Take 1 Tab by mouth every six (6) hours as needed for Pain for up to 10 doses. 10 Tab 0    cyclobenzaprine (FLEXERIL) 5 mg tablet Take 2 Tabs by mouth three (3) times daily. 9 Tab 0    predniSONE (DELTASONE) 50 mg tablet Take 1 Tab by mouth daily for 5 days.  5 Tab 0    oxyCODONE-acetaminophen (PERCOCET) 5-325 mg per tablet Take 1 tablet every 4-6 hours as needed for pain control. If you were instructed to try over the counter ibuprofen or tylenol, only take the percocet for pain not controlled with the over the counter medication. 6 Tab 0    cyclobenzaprine (FLEXERIL) 10 mg tablet Take 1 Tab by mouth three (3) times daily as needed for Muscle Spasm(s). 12 Tab 0    omeprazole (PRILOSEC) 20 mg capsule Take 1 Cap by mouth daily. Indications: gastroesophageal reflux disease 30 Cap 5    methylPREDNISolone (MEDROL, PAPITO,) 4 mg tablet Take 1 Tab by mouth Specific Days and Specific Times. Take as directed 1 Dose Pack 0    PARoxetine (PAXIL) 10 mg tablet Take  by mouth daily.  busPIRone (BUSPAR) 10 mg tablet Take 10 mg by mouth three (3) times daily.  gabapentin (NEURONTIN) 100 mg capsule Take  by mouth three (3) times daily.  benzocaine-menthol (CEPACOL SORE THROAT, SARAH BETH-MEN,) 15-2.6 mg lozg lozenge Take 1 Lozenge by mouth every two (2) hours as needed for Sore throat. 18 Lozenge 0    ondansetron (ZOFRAN ODT) 4 mg disintegrating tablet Take 1 Tab by mouth every eight (8) hours as needed for Nausea. 10 Tab 0    albuterol (PROVENTIL VENTOLIN) 2.5 mg /3 mL (0.083 %) nebulizer solution 3 mL by Nebulization route every four (4) hours as needed for Wheezing or Shortness of Breath. 24 Each 0    hydrochlorothiazide (HYDRODIURIL) 25 mg tablet Take 25 mg by mouth daily.          Past History     Past Medical History:  Past Medical History:   Diagnosis Date    Asthma     [facility] [address]     [facility]     Hypertension     Sciatica        Past Surgical History:  Past Surgical History:   Procedure Laterality Date    HX OTHER SURGICAL      right hand       Family History:  Family History   Problem Relation Age of Onset    Asthma Other     Hypertension Other        Social History:  Social History   Substance Use Topics    Smoking status: Current Every Day Smoker Packs/day: 0.50     Years: 43.00    Smokeless tobacco: Never Used    Alcohol use Yes      Comment: social        Allergies: Allergies   Allergen Reactions    Benadryl [Diphenhydramine Hcl] Myalgia    Lortab [Hydrocodone-Acetaminophen] Nausea and Vomiting    Motrin [Ibuprofen] Nausea and Vomiting    Penicillins Other (comments)     hypotension         Review of Systems   Review of Systems   Constitutional: Negative for fatigue and fever. HENT: Negative for congestion. Eyes: Negative for pain. Respiratory: Negative for cough and shortness of breath. Cardiovascular: Negative for chest pain. Gastrointestinal: Negative for abdominal pain, constipation, diarrhea, nausea and vomiting. Genitourinary: Negative for difficulty urinating and dysuria. Musculoskeletal: Positive for back pain and gait problem. Skin: Negative for wound. Neurological: Negative for dizziness and headaches. All other systems reviewed and are negative. All Other Systems Negative  Physical Exam     Vitals:    07/06/18 1953   BP: (!) 156/106   Pulse: (!) 111   Resp: 14   Temp: 97.9 °F (36.6 °C)   SpO2: 99%   Weight: 104.3 kg (230 lb)   Height: 5' 9\" (1.753 m)     Physical Exam   Constitutional: She is oriented to person, place, and time. She appears well-developed and well-nourished. She appears distressed. Pt is ambulatory to triage. Pt appears obese     HENT:   Head: Normocephalic and atraumatic. Facial tattoos noted   Eyes: Conjunctivae are normal. Pupils are equal, round, and reactive to light. Neck: Normal range of motion. Cardiovascular: Normal rate. Pulmonary/Chest: Effort normal. No respiratory distress. Musculoskeletal:        Lumbar back: She exhibits decreased range of motion, tenderness and bony tenderness. Torso is tilted forward and to right       Neurological: She is alert and oriented to person, place, and time. She has normal strength. GCS eye subscore is 4. GCS verbal subscore is 5.  GCS motor subscore is 6. Reflex Scores:       Patellar reflexes are 2+ on the right side and 2+ on the left side. Skin: Skin is warm and dry. Psychiatric: She has a normal mood and affect. Vitals reviewed. Diagnostic Study Results     Labs -   No results found for this or any previous visit (from the past 12 hour(s)). Radiologic Studies -   XR SPINE LUMB MIN 4 V    (Results Pending)     CT Results  (Last 48 hours)    None        CXR Results  (Last 48 hours)    None            Medical Decision Making   I am the first provider for this patient. I reviewed the vital signs, available nursing notes, past medical history, past surgical history, family history and social history. Vital Signs-Reviewed the patient's vital signs. Pulse Oximetry Analysis - 99% on RA      Records Reviewed: Old Medical Records    Procedures:  Procedures    Provider Notes (Medical Decision Making):     Pt states some improvement after medication administration. Will discharge home at this time. Xray negative as read by self    MED RECONCILIATION:  Current Facility-Administered Medications   Medication Dose Route Frequency    predniSONE (DELTASONE) tablet 60 mg  60 mg Oral NOW    ketorolac (TORADOL) injection 30 mg  30 mg IntraMUSCular NOW    oxyCODONE-acetaminophen (PERCOCET) 5-325 mg per tablet 1 Tab  1 Tab Oral NOW     Current Outpatient Prescriptions   Medication Sig    ketorolac (TORADOL) 10 mg tablet Take 1 Tab by mouth every six (6) hours as needed for Pain for up to 10 doses.  cyclobenzaprine (FLEXERIL) 5 mg tablet Take 2 Tabs by mouth three (3) times daily.  predniSONE (DELTASONE) 50 mg tablet Take 1 Tab by mouth daily for 5 days.  oxyCODONE-acetaminophen (PERCOCET) 5-325 mg per tablet Take 1 tablet every 4-6 hours as needed for pain control.   If you were instructed to try over the counter ibuprofen or tylenol, only take the percocet for pain not controlled with the over the counter medication.  cyclobenzaprine (FLEXERIL) 10 mg tablet Take 1 Tab by mouth three (3) times daily as needed for Muscle Spasm(s).  omeprazole (PRILOSEC) 20 mg capsule Take 1 Cap by mouth daily. Indications: gastroesophageal reflux disease    methylPREDNISolone (MEDROL, PAPITO,) 4 mg tablet Take 1 Tab by mouth Specific Days and Specific Times. Take as directed    PARoxetine (PAXIL) 10 mg tablet Take  by mouth daily.  busPIRone (BUSPAR) 10 mg tablet Take 10 mg by mouth three (3) times daily.  gabapentin (NEURONTIN) 100 mg capsule Take  by mouth three (3) times daily.  benzocaine-menthol (CEPACOL SORE THROAT, SARAH BETH-MEN,) 15-2.6 mg lozg lozenge Take 1 Lozenge by mouth every two (2) hours as needed for Sore throat.  ondansetron (ZOFRAN ODT) 4 mg disintegrating tablet Take 1 Tab by mouth every eight (8) hours as needed for Nausea.  albuterol (PROVENTIL VENTOLIN) 2.5 mg /3 mL (0.083 %) nebulizer solution 3 mL by Nebulization route every four (4) hours as needed for Wheezing or Shortness of Breath.  hydrochlorothiazide (HYDRODIURIL) 25 mg tablet Take 25 mg by mouth daily. Disposition:  discharge    DISCHARGE NOTE:     Pt has been reexamined. Patient has no new complaints, changes, or physical findings. Care plan outlined and precautions discussed. Results of exam/xray were reviewed with the patient. All medications were reviewed with the patient; will d/c home with steroids, NSAIDS and muscle relaxants. All of pt's questions and concerns were addressed. Patient was instructed and agrees to follow up with PCP/Ortho, as well as to return to the ED upon further deterioration. Patient is ready to go home.     Follow-up Information     Follow up With Details Comments [facility], MD Go to as scheduled Mehdi [phone] [facility], [address] [place] [address] Call in 3 days follow up [address] [facility] [place] [address]    [facility] EMERGENCY DEPT  If symptoms worsen [address] [facility] [address] [address]           Current Discharge Medication List      START taking these medications    Details   ketorolac (TORADOL) 10 mg tablet Take 1 Tab by mouth every six (6) hours as needed for Pain for up to 10 doses. Qty: 10 Tab, Refills: 0      !! cyclobenzaprine (FLEXERIL) 5 mg tablet Take 2 Tabs by mouth three (3) times daily. Qty: 9 Tab, Refills: 0      predniSONE (DELTASONE) 50 mg tablet Take 1 Tab by mouth daily for 5 days. Qty: 5 Tab, Refills: 0       !! - Potential duplicate medications found. Please discuss with provider. CONTINUE these medications which have NOT CHANGED    Details   oxyCODONE-acetaminophen (PERCOCET) 5-325 mg per tablet Take 1 tablet every 4-6 hours as needed for pain control. If you were instructed to try over the counter ibuprofen or tylenol, only take the percocet for pain not controlled with the over the counter medication. Qty: 6 Tab, Refills: 0    Associated Diagnoses: Acute bilateral low back pain with right-sided sciatica      !! cyclobenzaprine (FLEXERIL) 10 mg tablet Take 1 Tab by mouth three (3) times daily as needed for Muscle Spasm(s). Qty: 12 Tab, Refills: 0    Associated Diagnoses: Acute bilateral low back pain with right-sided sciatica      omeprazole (PRILOSEC) 20 mg capsule Take 1 Cap by mouth daily. Indications: gastroesophageal reflux disease  Qty: 30 Cap, Refills: 5      methylPREDNISolone (MEDROL, PAPITO,) 4 mg tablet Take 1 Tab by mouth Specific Days and Specific Times. Take as directed  Qty: 1 Dose Pack, Refills: 0      PARoxetine (PAXIL) 10 mg tablet Take  by mouth daily. busPIRone (BUSPAR) 10 mg tablet Take 10 mg by mouth three (3) times daily. gabapentin (NEURONTIN) 100 mg capsule Take  by mouth three (3) times daily.       benzocaine-menthol (CEPACOL SORE THROAT, SARAH BETH-MEN,) 15-2.6 mg lozg lozenge Take 1 Lozenge by mouth every two (2) hours as needed for Sore throat. Qty: 18 Lozenge, Refills: 0      ondansetron (ZOFRAN ODT) 4 mg disintegrating tablet Take 1 Tab by mouth every eight (8) hours as needed for Nausea. Qty: 10 Tab, Refills: 0      albuterol (PROVENTIL VENTOLIN) 2.5 mg /3 mL (0.083 %) nebulizer solution 3 mL by Nebulization route every four (4) hours as needed for Wheezing or Shortness of Breath. Qty: 24 Each, Refills: 0      hydrochlorothiazide (HYDRODIURIL) 25 mg tablet Take 25 mg by mouth daily. !! - Potential duplicate medications found. Please discuss with provider. Diagnosis     Clinical Impression:   1.  Chronic right-sided low back pain with right-sided sciatica

## 2018-07-07 NOTE — ED NOTES
EXAM note    Chief Complaint   Patient presents with   • Pain     left trapezius muscle strain.  follow up.  states better today.           ICD-10-CM   1. Trapezius muscle strain, left, sequela S46.812S         History    Keshawn Longo is a 54 year old male who presents in follow-up of left neck, trapezius, and upper back pain. Onset was around 7/30/2017. He was initially seen in urgent care and prescribed prednisone 50 mg daily for 5 days  and Flexeril every 8 hours. He was off work for 1 week and then went back to work. Pain became severe with returning to work.     Patient started physical therapy last week. He had dry needling performed yesterday which he reports he woke up this morning with almost no pain. He is requesting one more week off work to complete a few more sessions of physical therapy and return back stronger.    Tylenol Extra Strength 2 tablets every 6 hours. He is no longer using the Flexeril as he did not feel that it was effective.    Problem   Strain of Left Trapezius Muscle       Current Outpatient Prescriptions   Medication Sig   • cyclobenzaprine (FLEXERIL) 10 MG tablet Take 1 tablet by mouth 3 times daily as needed for Muscle spasms.   • losartan-hydrochlorothiazide (HYZAAR) 100-12.5 MG per tablet Take 1 tablet by mouth daily. Indications: High Blood Pressure   • dilTIAZem (DILTIAZEM CD) 180 MG 24 hr capsule Take 1 capsule by mouth daily.   • rivaroxaban (XARELTO) 20 MG Tab Take 1 tablet by mouth daily (with dinner).   • rosuvastatin (CRESTOR) 5 MG tablet Take 1 tablet by mouth daily.   • nicotine polacrilex (NICOTINE MINI) 2 MG lozenge Place 1 lozenge inside cheek as needed for Smoking cessation.   • aspirin 81 MG tablet Take 1 tablet by mouth daily.   • Multiple Vitamins-Minerals (MULTIVITAMIN PO) Take 1 tablet by mouth daily.   • Tobramycin 0.3 % Ointment Apply 1 Film to eye 3 times daily.     No current facility-administered medications for this visit.        ALLERGIES:   Allergen  I have reviewed discharge instructions with the patient. The patient verbalized understanding. Discharge medications reviewed with patient and appropriate educational materials and side effects teaching were provided. Patient in stable condition at discharge. Patient signed paper discharge instructions. Reactions   • Lisinopril-Hydrochlorothiazide GI UPSET and Cough       Past Medical History:   Diagnosis Date   • Atrial fibrillation (CMS/HCC) 11/2016   • Elevated BP    • Failed moderate sedation during procedure     woke with colonoscopy    • Herpes zoster    • Hx of toe surgery     bone surgery to left great toe   • Numbness and tingling 8/28/2014   • Pneumonia        Review of systems    See history of present illness otherwise 10 point review of systems is negative      Physical Exam    Vital Signs:    Vitals:    08/17/17 0919   BP: 122/80   Pulse: 87   Resp: 16   Temp: 99.2 °F (37.3 °C)   TempSrc: Oral   SpO2: 97%   Weight: 108 kg   Height: 6' 3\" (1.905 m)     Constitutional:  No acute distress. Acting and behaving appropriately.   Integument:   Neck: Range of motion is normal. Strength has improved. Mildly tender to palpation on left trapezius. No tenderness to left neck. Strength is grossly intact.   Respiratory:  Lungs clear to auscultation bilaterally equal rise and fall. Respirations nonlabored  Cardiovascular:  S1, S2, irregular rate and rhythm. No murmurs, rubs, or gallops.    Musculoskeletal:  Decreased strength in left upper extremity against resistance with shoulder extension. Ecchymosis is noted over left trapezius muscle.  Neurologic:  Alert & oriented x 3.       Assessment & Plan      ICD-10-CM   1. Trapezius muscle strain, left, sequela S46.812S       No problem-specific Assessment & Plan notes found for this encounter.      Continue physical therapy as per their recommendations.    Work excuse is given to be off work until reevaluation in 1 week. Continue with exercises 2-3 times a day and heat 20-30 minutes several times a day.    Tylenol extra strength as needed for pain.    No orders of the defined types were placed in this encounter.    See Patient Instruction section  Return in about 1 week (around 8/24/2017).

## 2020-05-25 ENCOUNTER — APPOINTMENT (OUTPATIENT)
Dept: GENERAL RADIOLOGY | Age: 60
End: 2020-05-25
Attending: PHYSICIAN ASSISTANT
Payer: MEDICARE

## 2020-05-25 ENCOUNTER — HOSPITAL ENCOUNTER (EMERGENCY)
Age: 60
Discharge: HOME | End: 2020-05-25
Attending: EMERGENCY MEDICINE
Payer: MEDICARE

## 2020-05-25 VITALS
HEART RATE: 94 BPM | TEMPERATURE: 98.7 F | SYSTOLIC BLOOD PRESSURE: 145 MMHG | HEIGHT: 70 IN | DIASTOLIC BLOOD PRESSURE: 94 MMHG | RESPIRATION RATE: 20 BRPM | OXYGEN SATURATION: 98 % | WEIGHT: 234 LBS | BODY MASS INDEX: 33.5 KG/M2

## 2020-05-25 DIAGNOSIS — M54.31 SCIATICA OF RIGHT SIDE: ICD-10-CM

## 2020-05-25 DIAGNOSIS — S92.524A CLOSED NONDISPLACED FRACTURE OF MIDDLE PHALANX OF LESSER TOE OF RIGHT FOOT, INITIAL ENCOUNTER: Primary | ICD-10-CM

## 2020-05-25 PROCEDURE — 74011250636 HC RX REV CODE- 250/636: Performed by: PHYSICIAN ASSISTANT

## 2020-05-25 PROCEDURE — 74011250637 HC RX REV CODE- 250/637: Performed by: PHYSICIAN ASSISTANT

## 2020-05-25 PROCEDURE — A9270 NON-COVERED ITEM OR SERVICE: HCPCS | Performed by: PHYSICIAN ASSISTANT

## 2020-05-25 PROCEDURE — 99283 EMERGENCY DEPT VISIT LOW MDM: CPT

## 2020-05-25 PROCEDURE — 96372 THER/PROPH/DIAG INJ SC/IM: CPT

## 2020-05-25 PROCEDURE — 73630 X-RAY EXAM OF FOOT: CPT

## 2020-05-25 PROCEDURE — 74011636637 HC RX REV CODE- 636/637: Performed by: PHYSICIAN ASSISTANT

## 2020-05-25 RX ORDER — PREDNISONE 20 MG/1
60 TABLET ORAL
Status: COMPLETED | OUTPATIENT
Start: 2020-05-25 | End: 2020-05-25

## 2020-05-25 RX ORDER — CYCLOBENZAPRINE HCL 10 MG
10 TABLET ORAL 3 TIMES DAILY
Qty: 9 TAB | Refills: 0 | Status: SHIPPED | OUTPATIENT
Start: 2020-05-25 | End: 2020-05-28

## 2020-05-25 RX ORDER — METHYLPREDNISOLONE 4 MG/1
TABLET ORAL
Qty: 1 DOSE PACK | Refills: 0 | Status: SHIPPED | OUTPATIENT
Start: 2020-05-25

## 2020-05-25 RX ORDER — CYCLOBENZAPRINE HCL 10 MG
10 TABLET ORAL
Status: COMPLETED | OUTPATIENT
Start: 2020-05-25 | End: 2020-05-25

## 2020-05-25 RX ORDER — KETOROLAC TROMETHAMINE 30 MG/ML
15 INJECTION, SOLUTION INTRAMUSCULAR; INTRAVENOUS
Status: DISCONTINUED | OUTPATIENT
Start: 2020-05-25 | End: 2020-05-25

## 2020-05-25 RX ORDER — ACETAMINOPHEN 500 MG
1000 TABLET ORAL
Status: COMPLETED | OUTPATIENT
Start: 2020-05-25 | End: 2020-05-25

## 2020-05-25 RX ORDER — KETOROLAC TROMETHAMINE 15 MG/ML
15 INJECTION, SOLUTION INTRAMUSCULAR; INTRAVENOUS
Status: COMPLETED | OUTPATIENT
Start: 2020-05-25 | End: 2020-05-25

## 2020-05-25 RX ADMIN — KETOROLAC TROMETHAMINE 15 MG: 15 INJECTION, SOLUTION INTRAMUSCULAR; INTRAVENOUS at 15:17

## 2020-05-25 RX ADMIN — PREDNISONE 60 MG: 20 TABLET ORAL at 16:34

## 2020-05-25 RX ADMIN — ACETAMINOPHEN 1000 MG: 500 TABLET ORAL at 16:34

## 2020-05-25 RX ADMIN — CYCLOBENZAPRINE HYDROCHLORIDE 10 MG: 10 TABLET, FILM COATED ORAL at 16:34

## 2020-05-25 NOTE — ED TRIAGE NOTES
Patient arrives with c/o  Chronic right sided sciatic pain and broken toe 2nd digit right foot Pt recently moved to THE [facility] and is trying to get into \"Pain Management\" but due to corona she has not been able to get it arrainged.

## 2020-05-25 NOTE — ED PROVIDER NOTES
[facility] EMERGENCY DEPT    Date: 5/25/2020  Patient Name: Moses Cárdenas    History of Presenting Illness     Chief Complaint   Patient presents with    Toe Pain    Back Pain       61 y.o. female with noted past medical history including HIV, HTN and Sciatica presents to the emergency department complaining of right second toe pain onset 1 week ago after hitting it on a piece of furniture. Patient states the toe is bruised, swollen, constantly moderately painful. She reports taking over-the-counter medication without improvement. Patient also notes having a flare of her right sided sciatica. She states she was supposed to get in with pain management, but was unable to do so due to COVID-19. Patient reports being compliant with her HIV medication and states that she sees her infectious disease doctor regularly, her counts have been undetectable. Patient denies any numbness, weakness, bowel or bladder function loss, abdominal pain, change in chronic sciatic symptoms, other symptoms. Patient denies any other associated signs or symptoms. Patient denies any other complaints. Nursing notes regarding the HPI and triage nursing notes were reviewed. Prior medical records were reviewed. Current Outpatient Medications   Medication Sig Dispense Refill    methylPREDNISolone (Medrol, Felipe,) 4 mg tablet Per dose pack instructions 1 Dose Pack 0    cyclobenzaprine (FLEXERIL) 10 mg tablet Take 1 Tab by mouth three (3) times daily for 3 days. 9 Tab 0    ketorolac (TORADOL) 10 mg tablet Take 1 Tab by mouth every six (6) hours as needed for Pain for up to 10 doses. 10 Tab 0    oxyCODONE-acetaminophen (PERCOCET) 5-325 mg per tablet Take 1 tablet every 4-6 hours as needed for pain control. If you were instructed to try over the counter ibuprofen or tylenol, only take the percocet for pain not controlled with the over the counter medication.  6 Tab 0    omeprazole (PRILOSEC) 20 mg capsule Take 1 Cap by mouth daily. Indications: gastroesophageal reflux disease 30 Cap 5    PARoxetine (PAXIL) 10 mg tablet Take  by mouth daily.  busPIRone (BUSPAR) 10 mg tablet Take 10 mg by mouth three (3) times daily.  gabapentin (NEURONTIN) 100 mg capsule Take  by mouth three (3) times daily.  benzocaine-menthol (CEPACOL SORE THROAT, SARAH BETH-MEN,) 15-2.6 mg lozg lozenge Take 1 Lozenge by mouth every two (2) hours as needed for Sore throat. 18 Lozenge 0    ondansetron (ZOFRAN ODT) 4 mg disintegrating tablet Take 1 Tab by mouth every eight (8) hours as needed for Nausea. 10 Tab 0    albuterol (PROVENTIL VENTOLIN) 2.5 mg /3 mL (0.083 %) nebulizer solution 3 mL by Nebulization route every four (4) hours as needed for Wheezing or Shortness of Breath. 24 Each 0    hydrochlorothiazide (HYDRODIURIL) 25 mg tablet Take 25 mg by mouth daily. Past History     Past Medical History:  Past Medical History:   Diagnosis Date    Asthma     [facility]     HIV [facility]human immunodeficiency virus infection) [facility]     Hypertension     Sciatica        Past Surgical History:  Past Surgical History:   Procedure Laterality Date    HX OTHER SURGICAL      right hand       Family History:  Family History   Problem Relation Age of Onset    Asthma Other     Hypertension Other        Social History:  Social History     Tobacco Use    Smoking status: Current Every Day Smoker     Packs/day: 1.00     Years: 43.00     Pack years: 43.00    Smokeless tobacco: Never Used   Substance Use Topics    Alcohol use: Not Currently     Comment: social     Drug use: Not Currently     Types: Marijuana       Allergies:   Allergies   Allergen Reactions    Benadryl [Diphenhydramine Hcl] Myalgia    Lortab [Hydrocodone-Acetaminophen] Nausea and Vomiting    Motrin [Ibuprofen] Nausea and Vomiting    Penicillins Other (comments)     hypotension       Patient's primary care provider (as noted in EPIC): Other, Phys, MD    Review of Systems   Constitutional:  Denies malaise, fever, chills. GI/ABD:  Denies injury, pain, distention, nausea, vomiting, diarrhea. :  Denies injury, pain, dysuria or urgency. Back:  + right low back pain radiating down right posterior leg. Pelvis:  Denies injury or pain. Extremity/MS: + right 2nd toe pain/swelling/bruising. Neuro:  Denies neurologic symptoms/deficits/paresthesias. Skin: Denies injury, rash, itching or skin changes. All other systems negative as reviewed. Visit Vitals  BP (!) 145/94 (BP 1 Location: Right arm, BP Patient Position: Sitting)   Pulse 94   Temp 98.7 °F (37.1 °C)   Resp 20   Ht 5' 9.5\" (1.765 m)   Wt 106.1 kg (234 lb)   SpO2 98%   BMI 34.06 kg/m²       PHYSICAL EXAM:    CONSTITUTIONAL:  Alert, in no apparent distress;  well developed;  well nourished. HEAD:  Normocephalic, atraumatic. EYES:  EOMI. Non-icteric sclera. Normal conjunctiva. ENTM:  Mouth: mucous membranes moist.  NECK:  Supple  RESPIRATORY:  Chest clear, equal breath sounds, good air movement. Without wheezes, rhonchi, rales. CARDIOVASCULAR:  Regular rate and rhythm. No murmurs, rubs, or gallops. GI:  Normal bowel sounds, abdomen soft and non-tender. No rebound or guarding. BACK:  Reproducible paralumbar tenderness to palpation. No vertebral bony point tenderness or step off. No rash, lesions, bruising. Normal perianal sensation. Negative straight leg raise. UPPER EXT:  Normal inspection. LOWER EXT: Right second toe with ecchymosis, edema, tenderness palpation, neurovascularly intact distally with 5 out of 5 strength. NEURO:  Moves all four extremities, and grossly normal motor exam.  SKIN:  No rashes;  Normal for age. PSYCH:  Alert and normal affect. ED COURSE:      Xray right foot: Fx noted to middle phalanx 2nd toe     Patient has a fracture to her right second toe, nondisplaced. Keith tape was applied. She also has sciatica noted to the right side. Patient reports her sciatica is acute on chronic. Given Toradol IM here with improvement of pain. Rx for Medrol Dosepak and Flexeril at home, which patient may take Tylenol with. She agrees to follow-up with her orthopedic as well as a podiatrist when she gets home to THE [facility] in the next few days. She will return for any worsening. IMPRESSION AND MEDICAL DECISION MAKING:  Based upon the patients presentation with noted HPI and PE, along with the work up done in the emergency department, I believe that the patient is having sciatica. Diagnosis:   1. Closed nondisplaced fracture of middle phalanx of lesser toe of right foot, initial encounter    2. Sciatica of right side      Disposition: Discharge    Follow-up Information     Follow up With Specialties Details Why [facility]  In 3 days  [address]    Tsering Restrepo DPM Podiatry In 3 days    [address] [facility] [address]      [facility] EMERGENCY DEPT Emergency Medicine  If symptoms worsen [address]  [phone]          Discharge Medication List as of 5/25/2020  4:15 PM      CONTINUE these medications which have CHANGED    Details   methylPREDNISolone (Medrol, Felipe,) 4 mg tablet Per dose pack instructions, Print, Disp-1 Dose Pack, R-0      cyclobenzaprine (FLEXERIL) 10 mg tablet Take 1 Tab by mouth three (3) times daily for 3 days. , Print, Disp-9 Tab, R-0         CONTINUE these medications which have NOT CHANGED    Details   ketorolac (TORADOL) 10 mg tablet Take 1 Tab by mouth every six (6) hours as needed for Pain for up to 10 doses. , Print, Disp-10 Tab, R-0      oxyCODONE-acetaminophen (PERCOCET) 5-325 mg per tablet Take 1 tablet every 4-6 hours as needed for pain control.   If you were instructed to try over the counter ibuprofen or tylenol, only take the percocet for pain not controlled with the over the counter medication. , Print, Disp-6 Tab, R-0      omeprazole (PRILOSEC) 20 mg capsule Take 1 Cap by mouth daily. Indications: gastroesophageal reflux disease, Print, Disp-30 Cap, R-5      PARoxetine (PAXIL) 10 mg tablet Take  by mouth daily. , Historical Med      busPIRone (BUSPAR) 10 mg tablet Take 10 mg by mouth three (3) times daily. , Historical Med      gabapentin (NEURONTIN) 100 mg capsule Take  by mouth three (3) times daily. , Historical Med      benzocaine-menthol (CEPACOL SORE THROAT, SARAH BETH-MEN,) 15-2.6 mg lozg lozenge Take 1 Lozenge by mouth every two (2) hours as needed for Sore throat., Print, Disp-18 Lozenge, R-0      ondansetron (ZOFRAN ODT) 4 mg disintegrating tablet Take 1 Tab by mouth every eight (8) hours as needed for Nausea. , Print, Disp-10 Tab, R-0      albuterol (PROVENTIL VENTOLIN) 2.5 mg /3 mL (0.083 %) nebulizer solution 3 mL by Nebulization route every four (4) hours as needed for Wheezing or Shortness of Breath., Print, Disp-24 Each, R-0      hydrochlorothiazide (HYDRODIURIL) 25 mg tablet Take 25 mg by mouth daily. , Historical Med           [place]

## 2020-05-25 NOTE — DISCHARGE INSTRUCTIONS
Patient Education        Sciatica: Care Instructions  Your Care Instructions    Sciatica (say \"ycz-IG-dk-kuh\") is an irritation of one of the sciatic nerves, which come from the spinal cord in the lower back. The sciatic nerves and their branches extend down through the buttock to the foot. Sciatica can develop when an injured disc in the back presses against a spinal nerve root. Its main symptom is pain, numbness, or weakness that is often worse in the leg or foot than in the back. Sciatica often will improve and go away with time. Early treatment usually includes medicines and exercises to relieve pain. Follow-up care is a key part of your treatment and safety. Be sure to make and go to all appointments, and call your doctor if you are having problems. It's also a good idea to know your test results and keep a list of the medicines you take. How can you care for yourself at home? · Take pain medicines exactly as directed. ? If the doctor gave you a prescription medicine for pain, take it as prescribed. ? If you are not taking a prescription pain medicine, ask your doctor if you can take an over-the-counter medicine. · Use heat or ice to relieve pain. ? To apply heat, put a warm water bottle, heating pad set on low, or warm cloth on your back. Do not go to sleep with a heating pad on your skin. ? To use ice, put ice or a cold pack on the area for 10 to 20 minutes at a time. Put a thin cloth between the ice and your skin. · Avoid sitting if possible, unless it feels better than standing. · Alternate lying down with short walks. Increase your walking distance as you are able to without making your symptoms worse. · Do not do anything that makes your symptoms worse. When should you call for help? Call 911 anytime you think you may need emergency care.  For example, call if:    · You are unable to move a leg at all.   [facility] your doctor now or seek immediate medical care if:    · You have new or worse symptoms in your legs or buttocks. Symptoms may include:  ? Numbness or tingling. ? Weakness. ? Pain.     · You lose bladder or bowel control.    Watch closely for changes in your health, and be sure to contact your doctor if:    · You are not getting better as expected. Where can you learn more? Go to http://jessica-christina.info/  Enter Z239 in the search box to learn more about \"Sciatica: Care Instructions. \"  Current as of: June 26, 2019Content Version: 12.4  © 4432-7525 [employer]. Care instructions adapted under license by [employer] (which disclaims liability or warranty for this information). If you have questions about a medical condition or this instruction, always ask your healthcare professional. [address] any warranty or liability for your use of this information. Patient Education        Broken Toe: Care Instructions  Your Care Instructions  You have broken (fractured) a bone in your toe. This kind of fracture does not need a special cast or brace. \"Keith-taping\" your broken toe to a healthy toe next to it is almost always enough to treat the problem and ease symptoms. The toe may take 4 weeks or more to heal.  You heal best when you take good care of yourself. Eat a variety of healthy foods, and don't smoke. Follow-up care is a key part of your treatment and safety. Be sure to make and go to all appointments, and call your doctor if you are having problems. It's also a good idea to know your test results and keep a list of the medicines you take. How can you care for yourself at home? · Be safe with medicines. Take pain medicines exactly as directed. ? If the doctor gave you a prescription medicine for pain, take it as prescribed. ? If you are not taking a prescription pain medicine, ask your doctor if you can take an over-the-counter medicine.   · If your toe is taped to the toe next to it, your doctor has shown you how to change the tape. Protect the skin by putting something soft, such as felt or foam, between your toes before you tape them together. Never tape the toes together skin-to-skin. Your broken toe may need to be hermilo-taped for 2 to 4 weeks to heal.  · Rest and protect your toe. Do not walk on it until you can do so without too much pain. If the doctor has told you to use crutches, use them as instructed. · Put ice or a cold pack on your toe for 10 to 20 minutes at a time. Try to do this every 1 to 2 hours for the next 3 days (when you are awake) or until the swelling goes down. Put a thin cloth between the ice and your skin. · Prop up your foot on a pillow when you ice it or anytime you sit or lie down. Try to keep it above the level of your heart. This will help reduce swelling. · Make sure you go to your follow-up appointments. Your doctor will need to check that your toe is healing right. When should you call for help? Call your doctor now or seek immediate medical care if:    · You have severe pain.     · Your toe is cool or pale or changes color.     · You have tingling, weakness, or numbness in your toe.    Watch closely for changes in your health, and be sure to contact your doctor if:    · Pain and swelling get worse.     · You are not getting better as expected. Where can you learn more? Go to http://jessica-christina.info/  Enter [id number] in the search box to learn more about \"Broken Toe: Care Instructions. \"  Current as of: June 26, 2019Content Version: 12.4  © 7979-5078 [employer]. Care instructions adapted under license by [employer] (which disclaims liability or warranty for this information). If you have questions about a medical condition or this instruction, always ask your healthcare professional. [address] any warranty or liability for your use of this information.

## 2020-05-26 ENCOUNTER — TELEPHONE (OUTPATIENT)
Dept: CASE MANAGEMENT | Age: 60
End: 2020-05-26

## 2020-05-26 NOTE — TELEPHONE ENCOUNTER
Date/Time:  5/26/2020 9:30 AM  Attempted to reach patient by telephone. Left HIPPA compliant message requesting a return call. Will attempt to reach patient again.

## 2020-05-27 ENCOUNTER — TELEPHONE (OUTPATIENT)
Dept: CASE MANAGEMENT | Age: 60
End: 2020-05-27

## 2020-05-27 NOTE — TELEPHONE ENCOUNTER
Patient contacted regarding recent discharge and COVID-19 risk   Care Coordinator contacted the patient by telephone to perform post discharge assessment. Verified name and  with patient as identifiers. Patient has following risk factors of: HIV. Care Coordinator reviewed discharge instructions, medical action plan and red flags related to discharge diagnosis. Reviewed and educated them on any new and changed medications related to discharge diagnosis. Advised obtaining a 90-day supply of all daily and as-needed medications. Education provided regarding infection prevention, and signs and symptoms of COVID-19 and when to seek medical attention with patient who verbalized understanding. Discussed exposure protocols and quarantine from [address] you at higher risk for severe illness  and given an opportunity for questions and concerns. The patient agrees to contact the COVID-19 hotline [phone] or PCP office for questions related to their healthcare. Care Coordinator provided contact information for future reference. From CDC: Are you at higher risk for severe illness?  Wash your hands often.  Avoid close contact (6 feet, which is about two arm lengths) with people who are sick.  Put distance between yourself and other people if COVID-19 is spreading in your community.  Clean and disinfect frequently touched surfaces.  Avoid all cruise travel and non-essential air travel.  Call your healthcare professional if you have concerns about COVID-19 and your underlying condition or if you are sick. For more information on steps you can take to protect yourself, see CDC's How to Protect Yourself      Patient/family/caregiver given information for [employer] Loop and agrees to enroll no  Patient's preferred e-mail:    Patient's preferred phone number:   Based on Loop alert triggers, patient will be contacted by nurse care manager for worsening symptoms.     Plan for follow-up call in 7-14 days based on severity of symptoms and risk factors. Patient states she will be moving back to [place] next week.

## 2020-06-16 ENCOUNTER — TELEPHONE (OUTPATIENT)
Dept: CASE MANAGEMENT | Age: 60
End: 2020-06-16

## 2022-03-19 PROBLEM — B20 HIV DISEASE (HCC): Status: ACTIVE | Noted: 2018-03-13

## 2022-03-19 PROBLEM — J45.901 ASTHMA EXACERBATION: Status: ACTIVE | Noted: 2018-04-19

## 2023-05-16 RX ORDER — OMEPRAZOLE 20 MG/1
CAPSULE, DELAYED RELEASE ORAL DAILY
COMMUNITY
Start: 2018-04-20

## 2023-05-16 RX ORDER — OXYCODONE HYDROCHLORIDE AND ACETAMINOPHEN 5; 325 MG/1; MG/1
TABLET ORAL
COMMUNITY
Start: 2018-07-03

## 2023-05-16 RX ORDER — BUSPIRONE HYDROCHLORIDE 10 MG/1
TABLET ORAL 3 TIMES DAILY
COMMUNITY

## 2023-05-16 RX ORDER — HYDROCHLOROTHIAZIDE 25 MG/1
25 TABLET ORAL DAILY
COMMUNITY

## 2023-05-16 RX ORDER — KETOROLAC TROMETHAMINE 10 MG/1
TABLET, FILM COATED ORAL EVERY 6 HOURS PRN
COMMUNITY
Start: 2018-07-06

## 2023-05-16 RX ORDER — METHYLPREDNISOLONE 4 MG/1
TABLET ORAL
COMMUNITY
Start: 2020-05-25

## 2023-05-16 RX ORDER — PAROXETINE 10 MG/1
TABLET, FILM COATED ORAL DAILY
COMMUNITY

## 2023-05-16 RX ORDER — ONDANSETRON 4 MG/1
TABLET, ORALLY DISINTEGRATING ORAL EVERY 8 HOURS PRN
COMMUNITY
Start: 2018-03-13

## 2023-05-16 RX ORDER — ALBUTEROL SULFATE 2.5 MG/3ML
SOLUTION RESPIRATORY (INHALATION) EVERY 4 HOURS PRN
COMMUNITY
Start: 2015-10-27

## 2023-05-16 RX ORDER — GABAPENTIN 100 MG/1
CAPSULE ORAL 3 TIMES DAILY
COMMUNITY